# Patient Record
Sex: FEMALE | Race: WHITE | NOT HISPANIC OR LATINO | Employment: OTHER | URBAN - METROPOLITAN AREA
[De-identification: names, ages, dates, MRNs, and addresses within clinical notes are randomized per-mention and may not be internally consistent; named-entity substitution may affect disease eponyms.]

---

## 2017-03-09 ENCOUNTER — OFFICE VISIT (OUTPATIENT)
Dept: FAMILY MEDICINE | Facility: CLINIC | Age: 47
End: 2017-03-09
Payer: COMMERCIAL

## 2017-03-09 VITALS
HEIGHT: 65 IN | SYSTOLIC BLOOD PRESSURE: 120 MMHG | WEIGHT: 203 LBS | HEART RATE: 88 BPM | BODY MASS INDEX: 33.82 KG/M2 | TEMPERATURE: 98.7 F | DIASTOLIC BLOOD PRESSURE: 86 MMHG | OXYGEN SATURATION: 96 %

## 2017-03-09 DIAGNOSIS — J20.9 ACUTE BRONCHITIS, UNSPECIFIED ORGANISM: ICD-10-CM

## 2017-03-09 DIAGNOSIS — R05.9 COUGH: ICD-10-CM

## 2017-03-09 DIAGNOSIS — R68.89 FLU-LIKE SYMPTOMS: Primary | ICD-10-CM

## 2017-03-09 DIAGNOSIS — R07.0 THROAT PAIN: ICD-10-CM

## 2017-03-09 LAB
DEPRECATED S PYO AG THROAT QL EIA: NORMAL
FLUAV+FLUBV AG SPEC QL: NEGATIVE
FLUAV+FLUBV AG SPEC QL: NORMAL
MICRO REPORT STATUS: NORMAL
SPECIMEN SOURCE: NORMAL
SPECIMEN SOURCE: NORMAL

## 2017-03-09 PROCEDURE — 87081 CULTURE SCREEN ONLY: CPT | Performed by: PHYSICIAN ASSISTANT

## 2017-03-09 PROCEDURE — 87880 STREP A ASSAY W/OPTIC: CPT | Performed by: PHYSICIAN ASSISTANT

## 2017-03-09 PROCEDURE — 87804 INFLUENZA ASSAY W/OPTIC: CPT | Performed by: PHYSICIAN ASSISTANT

## 2017-03-09 PROCEDURE — 99214 OFFICE O/P EST MOD 30 MIN: CPT | Performed by: PHYSICIAN ASSISTANT

## 2017-03-09 RX ORDER — BENZONATATE 200 MG/1
200 CAPSULE ORAL 3 TIMES DAILY PRN
Qty: 30 CAPSULE | Refills: 0 | Status: SHIPPED | OUTPATIENT
Start: 2017-03-09 | End: 2017-09-11

## 2017-03-09 RX ORDER — ALBUTEROL SULFATE 90 UG/1
2 AEROSOL, METERED RESPIRATORY (INHALATION) EVERY 6 HOURS PRN
Qty: 1 INHALER | Refills: 0 | Status: SHIPPED | OUTPATIENT
Start: 2017-03-09 | End: 2017-09-11

## 2017-03-09 RX ORDER — AZITHROMYCIN 250 MG/1
TABLET, FILM COATED ORAL
Qty: 6 TABLET | Refills: 0 | Status: SHIPPED | OUTPATIENT
Start: 2017-03-09 | End: 2017-09-11

## 2017-03-09 RX ORDER — CLINDAMYCIN PHOSPHATE 11.9 MG/ML
SOLUTION TOPICAL
Refills: 3 | COMMUNITY
Start: 2016-09-27 | End: 2021-01-19

## 2017-03-09 ASSESSMENT — PAIN SCALES - GENERAL: PAINLEVEL: MILD PAIN (2)

## 2017-03-09 NOTE — PATIENT INSTRUCTIONS
Tessalon pearls three times daily for cough    Albuterol inhaler 2 puffs every 6 hrs as needed    Monitor sxs over the next few days. If worsening or not improving at the end of the weekend, then would start the zithromax.    If high fever, chest pain, short of breath, painful breathing should be seen again.

## 2017-03-09 NOTE — MR AVS SNAPSHOT
After Visit Summary   3/9/2017    Neelima Gardner    MRN: [id number]           Patient Information     Date Of Birth          1970        Visit Information        Provider Department      3/9/2017 1:40 PM Sonali Melendrez PA-C [facility]        Today's Diagnoses     Flu-like symptoms    -  1    Throat pain        Cough        Acute bronchitis, unspecified organism          Care Instructions    Tessalon pearls three times daily for cough    Albuterol inhaler 2 puffs every 6 hrs as needed    Monitor sxs over the next few days. If worsening or not improving at the end of the weekend, then would start the zithromax.    If high fever, chest pain, short of breath, painful breathing should be seen again.         Follow-ups after your visit        Who to contact     If you have questions or need follow up information about today's clinic visit or your schedule please contact [facility] directly at [phone].  Normal or non-critical lab and imaging results will be communicated to you by [employer], letter or phone within 4 business days after the clinic has received the results. If you do not hear from us within 7 days, please contact the clinic through [employer] or phone. If you have a critical or abnormal lab result, we will notify you by phone as soon as possible.  Submit refill requests through [employer] or call your pharmacy and they will forward the refill request to us. Please allow 3 business days for your refill to be completed.          Additional Information About Your Visit        MyChart Information     [employer] gives you secure access to your electronic health record. If you see a primary care provider, you can also send messages to your care team and make appointments. If you have questions, please call your primary care clinic.  If you do not have a primary care provider, please call [phone] and they will assist you.        Care EveryWhere ID     This is your Care  "EveryWhere ID. This could be used by other organizations to access your [facility] medical records  [id number]        Your Vitals Were     Pulse Temperature Height Last Period Pulse Oximetry BMI (Body Mass Index)    88 98.7  F (37.1  C) (Temporal) 5' 5\" (1.651 m) 02/22/2017 96% 33.78 kg/m2       Blood Pressure from Last 3 Encounters:   03/09/17 120/86   12/15/15 131/83   08/19/15 124/78    Weight from Last 3 Encounters:   03/09/17 203 lb (92.1 kg)   12/15/15 198 lb 9.6 oz (90.1 kg)   07/29/15 196 lb 8 oz (89.1 kg)              We Performed the Following     Beta strep group A culture     Influenza A/B antigen     Strep, Rapid Screen          Today's Medication Changes          These changes are accurate as of: 3/9/17  2:21 PM.  If you have any questions, ask your nurse or doctor.               Start taking these medicines.        Dose/Directions    albuterol 108 (90 BASE) MCG/ACT Inhaler   Commonly known as:  PROAIR HFA/PROVENTIL HFA/VENTOLIN HFA   Used for:  Cough   Started by:  Sonali Melendrez PA-C        Dose:  2 puff   Inhale 2 puffs into the lungs every 6 hours as needed for shortness of breath / dyspnea or wheezing   Quantity:  1 Inhaler   Refills:  0       azithromycin 250 MG tablet   Commonly known as:  ZITHROMAX   Used for:  Acute bronchitis, unspecified organism   Started by:  Sonali Melendrez PA-C        Two tablets first day, then one tablet daily for four days.   Quantity:  6 tablet   Refills:  0       benzonatate 200 MG capsule   Commonly known as:  TESSALON   Used for:  Cough   Started by:  Sonali Melendrez PA-C        Dose:  200 mg   Take 1 capsule (200 mg) by mouth 3 times daily as needed for cough   Quantity:  30 capsule   Refills:  0            Where to get your medicines      These medications were sent to [facility] [address] - [address]  [address], [place] [address]    Hours:  test fax sent successfully 1/20/04  kr " Phone:  [phone]     albuterol 108 (90 BASE) MCG/ACT Inhaler    azithromycin 250 MG tablet    benzonatate 200 MG capsule                Primary Care Provider Office Phone # Fax #    Vani Yang [phone]       [facility] [address] [facility] [address]        Thank you!     Thank you for choosing [facility]  for your care. Our goal is always to provide you with excellent care. Hearing back from our patients is one way we can continue to improve our services. Please take a few minutes to complete the written survey that you may receive in the mail after your visit with us. Thank you!             Your Updated Medication List - Protect others around you: Learn how to safely use, store and throw away your medicines at www.disposemymeds.org.          This list is accurate as of: 3/9/17  2:21 PM.  Always use your most recent med list.                   Brand Name Dispense Instructions for use    ADVIL PO      Take 200 mg by mouth every 6 hours as needed for moderate pain       albuterol 108 (90 BASE) MCG/ACT Inhaler    PROAIR HFA/PROVENTIL HFA/VENTOLIN HFA    1 Inhaler    Inhale 2 puffs into the lungs every 6 hours as needed for shortness of breath / dyspnea or wheezing       ALLEGRA PO      Take by mouth as needed for allergies       azithromycin 250 MG tablet    ZITHROMAX    6 tablet    Two tablets first day, then one tablet daily for four days.       benzonatate 200 MG capsule    TESSALON    30 capsule    Take 1 capsule (200 mg) by mouth 3 times daily as needed for cough       clindamycin 1 % solution    CLEOCIN T     Reported on 3/9/2017       EXCEDRIN PO      PRN

## 2017-03-09 NOTE — PROGRESS NOTES
"  SUBJECTIVE:                                                    Neelima Gardner is a 46 year old female who presents to clinic today for the following health issues:      Acute Illness   Acute illness concerns: cough   Onset: was sick for 2 weeks end of February-- same as this, got better for about 10 days. Totally back to normal.   Then has been sick again-- most recent illness - has been 1 week.  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     Fever: YES- low grade fever -- never took it thinks about 100.0 was yesterday.     Chills/Sweats: no    Headache (location?): YES- facial, when cough \"has to hold head because hurts'-  Pressure.     Sinus Pressure; no;  post-nasal drainage, headache     Conjunctivitis:  no    Ear Pain: no    Rhinorrhea: YES- mostly clear    Congestion: YES    Sore Throat: YES     Cough: YES-non-productive/productive clear ; worse when laying down at night.     Wheeze: YES    CP, SOB - no. But feels congested and winded with stairs.     Decreased Appetite: YES    Nausea: no    Vomiting: no    Diarrhea:  YES-\" the first three day, im fine now\"    Dysuria/Freq.: no     Fatigue/Achiness: YES- fatigue     Sick/Strep Exposure: no     No rashes  No abd pain    No asthma or lung conditions.   + allergies seasonal  + smoker-- had quit 3 years but restarted. 3-4 cig per day.          Therapies Tried and outcome: OTC meds -- nyquil, dayquil, sudafed, allergy meds, cough drops        Problem list and histories reviewed & adjusted, as indicated.  Additional history: as documented    Patient Active Problem List   Diagnosis     CARDIOVASCULAR SCREENING; LDL GOAL LESS THAN 160     Common wart     Past Surgical History   Procedure Laterality Date     C/section, low transverse       , Low Transverse       Social History   Substance Use Topics     Smoking status: Light Tobacco Smoker     Packs/day: 1.00     Types: Cigarettes     Last " "attempt to quit: 1/17/2011     Smokeless tobacco: Never Used     Alcohol use Yes      Comment: Occ     Family History   Problem Relation Age of Onset     OSTEOPOROSIS Mother      DIABETES Father      Colon Cancer Paternal Aunt          Current Outpatient Prescriptions   Medication Sig Dispense Refill     albuterol (PROAIR HFA/PROVENTIL HFA/VENTOLIN HFA) 108 (90 BASE) MCG/ACT Inhaler Inhale 2 puffs into the lungs every 6 hours as needed for shortness of breath / dyspnea or wheezing 1 Inhaler 0     benzonatate (TESSALON) 200 MG capsule Take 1 capsule (200 mg) by mouth 3 times daily as needed for cough 30 capsule 0     Ibuprofen (ADVIL PO) Take 200 mg by mouth every 6 hours as needed for moderate pain       Fexofenadine HCl (ALLEGRA PO) Take by mouth as needed for allergies       EXCEDRIN OR PRN       clindamycin (CLEOCIN T) 1 % solution Reported on 3/9/2017  3     Allergies   Allergen Reactions     Codeine      Sulfa Drugs      BP Readings from Last 3 Encounters:   03/09/17 120/86   12/15/15 131/83   08/19/15 124/78    Wt Readings from Last 3 Encounters:   03/09/17 203 lb (92.1 kg)   12/15/15 198 lb 9.6 oz (90.1 kg)   07/29/15 196 lb 8 oz (89.1 kg)                  Labs reviewed in EPIC    Reviewed and updated as needed this visit by clinical staff       Reviewed and updated as needed this visit by Provider         ROS:  Constitutional, HEENT, cardiovascular, pulmonary, gi and gu systems are negative, except as otherwise noted.    OBJECTIVE:                                                    /86 (BP Location: Left arm, Cuff Size: Adult Large)  Pulse 88  Temp 98.7  F (37.1  C) (Temporal)  Ht 5' 5\" (1.651 m)  Wt 203 lb (92.1 kg)  LMP 02/22/2017  SpO2 96%  BMI 33.78 kg/m2  Body mass index is 33.78 kg/(m^2).  GENERAL: mildly ill appearing, congested, alert and no distress, breathing comfortably  EYES: Eyes grossly normal to inspection, PERRL and conjunctivae and sclerae normal  HENT: Normal cephalic/atraumatic. Ear " canals clear and TM's normal, no effusion. Nose mucosa erythematous and swollen turbinates. Lips normal, no lesions. Buccal muscosa moist. Soft palate no lesions or ulcers. Tonsils normal, no significant erythema, no exudates. Uvula midline. Posterior oropharynx mild erythema, no drainage.   NECK: no adenopathy and no asymmetry, masses, or scars  RESP: lungs clear to auscultation - no rales, rhonchi or wheezes; cough dry, bronchial  CV: regular rate and rhythm, normal S1 S2, no S3 or S4, no murmur, click or rub  SKIN: no suspicious lesions or rashes      Diagnostic Test Results:  Results for orders placed or performed in visit on 03/09/17 (from the past 24 hour(s))   Strep, Rapid Screen   Result Value Ref Range    Specimen Description Throat     Rapid Strep A Screen       NEGATIVE: No Group A streptococcal antigen detected by immunoassay, await   culture report.      Micro Report Status FINAL 03/09/2017    Influenza A/B antigen   Result Value Ref Range    Influenza A/B Agn Specimen Nasopharyngeal     Influenza A Negative NEG    Influenza B  NEG     Negative   Test results must be correlated with clinical data. If necessary, results   should be confirmed by a molecular assay or viral culture.          ASSESSMENT/PLAN:                                                      1. Flu-like symptoms  Negative influenza  - Influenza A/B antigen    2. Throat pain  Negative strep, will send for culture and treat if positive.  - Strep, Rapid Screen  - Beta strep group A culture    3. Cough  Discussed viral bronchitis. At this stage, would not start antibitoic  Treat as below.   - albuterol (PROAIR HFA/PROVENTIL HFA/VENTOLIN HFA) 108 (90 BASE) MCG/ACT Inhaler; Inhale 2 puffs into the lungs every 6 hours as needed for shortness of breath / dyspnea or wheezing  Dispense: 1 Inhaler; Refill: 0  - benzonatate (TESSALON) 200 MG capsule; Take 1 capsule (200 mg) by mouth 3 times daily as needed for cough  Dispense: 30 capsule; Refill:  0    4. Acute bronchitis, unspecified organism  Discussed montior into weekend. If at weekend end, worsening (productive cough, increasing sinus pain/drainage), with her smoking, would treat as below.  Should be seen though if new fever, CP, SOB, pleuritic pain.   - azithromycin (ZITHROMAX) 250 MG tablet; Two tablets first day, then one tablet daily for four days.  Dispense: 6 tablet; Refill: 0    Follow Up: For worsening symptoms (ie new fevers, worsening pain, etc), non-improvement as expected/discussed, questions regarding your medications or treatment plan. Discussed parameters for follow up and included in After Visit Summary given to patient.      Sonali Melendrez PA-C  [facility]

## 2017-03-09 NOTE — NURSING NOTE
"Chief Complaint   Patient presents with     Cough     Cold Symptoms     Panel Management     FAUSTO, Tdap, Pap       Initial /86 (BP Location: Left arm, Cuff Size: Adult Large)  Pulse 88  Temp 98.7  F (37.1  C) (Temporal)  Ht 5' 5\" (1.651 m)  Wt 203 lb (92.1 kg)  LMP 02/22/2017  SpO2 96%  BMI 33.78 kg/m2 Estimated body mass index is 33.78 kg/(m^2) as calculated from the following:    Height as of this encounter: 5' 5\" (1.651 m).    Weight as of this encounter: 203 lb (92.1 kg).  Medication Reconciliation: complete       Umair Bailey MA    "

## 2017-03-11 LAB
BACTERIA SPEC CULT: NORMAL
MICRO REPORT STATUS: NORMAL
SPECIMEN SOURCE: NORMAL

## 2017-06-08 ENCOUNTER — TRANSFERRED RECORDS (OUTPATIENT)
Dept: HEALTH INFORMATION MANAGEMENT | Facility: CLINIC | Age: 47
End: 2017-06-08

## 2017-06-12 ENCOUNTER — TRANSFERRED RECORDS (OUTPATIENT)
Dept: HEALTH INFORMATION MANAGEMENT | Facility: CLINIC | Age: 47
End: 2017-06-12

## 2017-06-12 LAB
CHOLEST SERPL-MCNC: 200 MG/DL (ref 100–199)
GLUCOSE SERPL-MCNC: 100 MG/DL (ref 65–99)
HDLC SERPL-MCNC: 51 MG/DL
LDLC SERPL CALC-MCNC: 128 MG/DL (ref 0–99)
TRIGL SERPL-MCNC: 105 MG/DL (ref 0–149)
TSH SERPL-ACNC: 2.3 ULU/ML (ref 0.45–4.5)

## 2017-06-14 ENCOUNTER — TRANSFERRED RECORDS (OUTPATIENT)
Dept: HEALTH INFORMATION MANAGEMENT | Facility: CLINIC | Age: 47
End: 2017-06-14

## 2017-06-15 ENCOUNTER — TRANSFERRED RECORDS (OUTPATIENT)
Dept: HEALTH INFORMATION MANAGEMENT | Facility: CLINIC | Age: 47
End: 2017-06-15

## 2017-07-07 ENCOUNTER — TRANSFERRED RECORDS (OUTPATIENT)
Dept: HEALTH INFORMATION MANAGEMENT | Facility: CLINIC | Age: 47
End: 2017-07-07

## 2017-07-21 ENCOUNTER — TRANSFERRED RECORDS (OUTPATIENT)
Dept: HEALTH INFORMATION MANAGEMENT | Facility: CLINIC | Age: 47
End: 2017-07-21

## 2017-09-11 ENCOUNTER — RADIANT APPOINTMENT (OUTPATIENT)
Dept: GENERAL RADIOLOGY | Facility: CLINIC | Age: 47
End: 2017-09-11
Attending: PHYSICIAN ASSISTANT
Payer: COMMERCIAL

## 2017-09-11 ENCOUNTER — OFFICE VISIT (OUTPATIENT)
Dept: FAMILY MEDICINE | Facility: CLINIC | Age: 47
End: 2017-09-11
Payer: COMMERCIAL

## 2017-09-11 VITALS
RESPIRATION RATE: 18 BRPM | SYSTOLIC BLOOD PRESSURE: 134 MMHG | TEMPERATURE: 98.5 F | DIASTOLIC BLOOD PRESSURE: 82 MMHG | WEIGHT: 186.9 LBS | HEART RATE: 78 BPM | BODY MASS INDEX: 31.14 KG/M2 | OXYGEN SATURATION: 96 % | HEIGHT: 65 IN

## 2017-09-11 DIAGNOSIS — R05.9 COUGH: ICD-10-CM

## 2017-09-11 DIAGNOSIS — J20.9 ACUTE BRONCHITIS, UNSPECIFIED ORGANISM: Primary | ICD-10-CM

## 2017-09-11 DIAGNOSIS — Z71.6 ENCOUNTER FOR SMOKING CESSATION COUNSELING: ICD-10-CM

## 2017-09-11 PROCEDURE — 99214 OFFICE O/P EST MOD 30 MIN: CPT | Performed by: PHYSICIAN ASSISTANT

## 2017-09-11 PROCEDURE — 71020 XR CHEST 2 VW: CPT

## 2017-09-11 RX ORDER — PREDNISONE 20 MG/1
20 TABLET ORAL DAILY
Qty: 5 TABLET | Refills: 0 | Status: SHIPPED | OUTPATIENT
Start: 2017-09-11 | End: 2017-12-13

## 2017-09-11 RX ORDER — ALBUTEROL SULFATE 90 UG/1
2 AEROSOL, METERED RESPIRATORY (INHALATION) EVERY 6 HOURS PRN
Qty: 1 INHALER | Refills: 0 | Status: SHIPPED | OUTPATIENT
Start: 2017-09-11 | End: 2020-01-16

## 2017-09-11 RX ORDER — IPRATROPIUM BROMIDE AND ALBUTEROL SULFATE 2.5; .5 MG/3ML; MG/3ML
1 SOLUTION RESPIRATORY (INHALATION) ONCE
Qty: 1 VIAL | Refills: 0 | Status: SHIPPED | OUTPATIENT
Start: 2017-09-11 | End: 2020-01-16

## 2017-09-11 RX ORDER — AZITHROMYCIN 250 MG/1
TABLET, FILM COATED ORAL
Qty: 6 TABLET | Refills: 0 | Status: SHIPPED | OUTPATIENT
Start: 2017-09-11 | End: 2017-12-13

## 2017-09-11 ASSESSMENT — PAIN SCALES - GENERAL: PAINLEVEL: NO PAIN (0)

## 2017-09-11 NOTE — PROGRESS NOTES
SUBJECTIVE:                                                    Neelima Gardner is a 47 year old female who presents to clinic today for the following health issues:    HPI    Acute Illness   Acute illness concerns: Cough  Onset: - 7 days of sx.   Started with sore throat, low grade fever and feeling like allergies- runny nose, water eyes, congestion, and feeling very fatigued, didn't go to work.    Headache from coughing  Cough is dry, not getting anything up. Sounds rattly and wheezy to her.   Nasal drainage turning off color but improving some.    Fever: YES- low grade- 1st day. Gone since.     Chills/Sweats: YES- sweats    Headache (location?): YES    Sinus Pressure:YES- post-nasal drainage and teeth pain    Conjunctivitis:  no    Ear Pain: no. Plugged feeling.     Rhinorrhea: YES- improving a little.     Congestion: YES    Sore Throat: no     Cough: YES-non-productive. Worsening. No CP. Feels like tightness in chest, some SOB. No pleuritic pain    Wheeze: YES    Decreased Appetite: YES    Nausea: YES.     No abd pain.     Vomiting: no    Diarrhea:  YES-- loose stools, 4-5x per day. Not watery, no tarry or bloody. Brown stool.     Dysuria/Freq.: no    Fatigue/Achiness: YES    Sick/Strep Exposure: no    No rashes  No swelling in the legs  Smoking - 3/4th pack per day. Denies daily cough.   No asthma    Travel- drove to [facility] and [place] in last 1 month.   Work- sales     Therapies Tried and outcome: nyquil, delsyum, sudafed, allergy meds    Would like to quit smoking. Did in the past with chantix. Had 1 odd dream when taking it. Found it helpful. Would like to try it again.    Problem list and histories reviewed & adjusted, as indicated.  Additional history: as documented    Patient Active Problem List   Diagnosis     CARDIOVASCULAR SCREENING; LDL GOAL LESS THAN 160     Common wart     Past Surgical History:   Procedure Laterality Date     C/SECTION, LOW TRANSVERSE      , Low Transverse      "  Social History   Substance Use Topics     Smoking status: Light Tobacco Smoker     Packs/day: 1.00     Types: Cigarettes     Last attempt to quit: 1/17/2011     Smokeless tobacco: Never Used     Alcohol use Yes      Comment: Occ     Family History   Problem Relation Age of Onset     OSTEOPOROSIS Mother      DIABETES Father      Colon Cancer Paternal Aunt          Current Outpatient Prescriptions   Medication Sig Dispense Refill     albuterol (PROAIR HFA/PROVENTIL HFA/VENTOLIN HFA) 108 (90 BASE) MCG/ACT Inhaler Inhale 2 puffs into the lungs every 6 hours as needed for shortness of breath / dyspnea or wheezing 1 Inhaler 0     Ibuprofen (ADVIL PO) Take 200 mg by mouth every 6 hours as needed for moderate pain       Fexofenadine HCl (ALLEGRA PO) Take by mouth as needed for allergies       EXCEDRIN OR PRN       clindamycin (CLEOCIN T) 1 % solution Reported on 3/9/2017  3     Allergies   Allergen Reactions     Codeine      Sulfa Drugs      BP Readings from Last 3 Encounters:   09/11/17 134/82   03/09/17 120/86   12/15/15 131/83    Wt Readings from Last 3 Encounters:   09/11/17 186 lb 14.4 oz (84.8 kg)   03/09/17 203 lb (92.1 kg)   12/15/15 198 lb 9.6 oz (90.1 kg)                Labs reviewed in EPIC      ROS:  As above    OBJECTIVE:     /82 (Cuff Size: Adult Regular)  Pulse 78  Temp 98.5  F (36.9  C) (Temporal)  Resp 18  Ht 5' 5\" (1.651 m)  Wt 186 lb 14.4 oz (84.8 kg)  SpO2 96%  BMI 31.1 kg/m2  Body mass index is 31.1 kg/(m^2).  GENERAL: healthy, alert and no distress  EYES: Eyes grossly normal to inspection, PERRL and conjunctivae and sclerae normal  HENT: Normal cephalic/atraumatic. Ear canals clear and TM's normal, no effusion. Nose mucosa congested, erythematous. Lips normal, no lesions. Buccal muscosa moist. Soft palate no lesions or ulcers. Tonsils normal, no erythema, no exudates. Uvula midline. Posterior oropharynx no erythema.   NECK: no adenopathy  RESP: spasms of coughing worse with deep " breathing/talking; + rhonchi right base, occasional wheeze  CV: regular rate and rhythm, normal S1 S2, no S3 or S4, no murmur, click or rub, no peripheral edema and peripheral pulses strong  MS: no gross musculoskeletal defects noted, no edema, no calf tenderness  SKIN: no suspicious lesions or rashes  NEURO: Normal strength and tone, mentation intact and speech normal    Diagnostic Test Results:  Xray -   Xr Chest 2 Views    Result Date: 9/11/2017  CHEST TWO VIEWS   9/11/2017 2:13 PM HISTORY: Cough. COMPARISON: None. FINDINGS: The lungs are clear. No pleural effusions or pneumothorax. Heart size and pulmonary vascularity are within normal limits. No acute fracture.     IMPRESSION: No evidence of acute cardiopulmonary disease is seen.      Post-neb treatment:   Exam: improvement in wheeze, improved airmovement. Persisting rhonchi, noteable left side as well now.   Pt reported somewhat easier breathing.        ASSESSMENT/PLAN:       1. Acute bronchitis, unspecified organism  Treatment as below  Discussed how to take, poss side effects.   Albuterol for home use.   Warning s/sx for f/u if worsening, not improving  Smoking cessation. See #3  - azithromycin (ZITHROMAX) 250 MG tablet; Two tablets first day, then one tablet daily for four days.  Dispense: 6 tablet; Refill: 0  - albuterol (PROAIR HFA/PROVENTIL HFA/VENTOLIN HFA) 108 (90 BASE) MCG/ACT Inhaler; Inhale 2 puffs into the lungs every 6 hours as needed for shortness of breath / dyspnea or wheezing  Dispense: 1 Inhaler; Refill: 0  - predniSONE (DELTASONE) 20 MG tablet; Take 1 tablet (20 mg) by mouth daily  Dispense: 5 tablet; Refill: 0    2. Cough  - XR Chest 2 Views; Future  - ipratropium - albuterol 0.5 mg/2.5 mg/3 mL (DUONEB) 0.5-2.5 (3) MG/3ML neb solution; Take 1 vial (3 mLs) by nebulization once for 1 dose  Dispense: 1 vial; Refill: 0  - albuterol (PROAIR HFA/PROVENTIL HFA/VENTOLIN HFA) 108 (90 BASE) MCG/ACT Inhaler; Inhale 2 puffs into the lungs every 6 hours  as needed for shortness of breath / dyspnea or wheezing  Dispense: 1 Inhaler; Refill: 0    3. Encounter for smoking cessation counseling  Discussed how to take/start, possible side effects.   - varenicline (CHANTIX STARTING MONTH LEO) 0.5 MG X 11 & 1 MG X 42 tablet; Take 0.5 mg tab daily for 3 days, then 0.5 mg tab twice daily for 4 days, then 1 mg twice daily.  Dispense: 53 tablet; Refill: 0    Follow Up: For worsening symptoms (ie new fevers, worsening pain, etc), non-improvement as expected/discussed, questions regarding your medications or treatment plan. Discussed parameters for follow up and included in After Visit Summary given to patient.      Sonali Melendrez PA-C  [facility]

## 2017-09-11 NOTE — MR AVS SNAPSHOT
After Visit Summary   9/11/2017    Neelima Gardner    MRN: [id number]           Patient Information     Date Of Birth          1970        Visit Information        Provider Department      9/11/2017 1:20 PM Sonali Melendrez PA-C [facility]        Today's Diagnoses     Acute bronchitis, unspecified organism    -  1    Cough        Encounter for smoking cessation counseling          Care Instructions      Bronchitis, Antibiotic Treatment (Adult)    Bronchitis is an infection of the air passages (bronchial tubes) in your lungs. It often occurs when you have a cold. This illness is contagious during the first few days and is spread through the air by coughing and sneezing, or by direct contact (touching the sick person and then touching your own eyes, nose, or mouth).  Symptoms of bronchitis include cough with mucus (phlegm) and low-grade fever. Bronchitis usually lasts 7 to 14 days. Mild cases can be treated with simple home remedies. More severe infection is treated with an antibiotic.  Home care  Follow these guidelines when caring for yourself at home:    If your symptoms are severe, rest at home for the first 2 to 3 days. When you go back to your usual activities, don't let yourself get too tired.    Do not smoke. Also avoid being exposed to secondhand smoke.    You may use over-the-counter medicines to control fever or pain, unless another medicine was prescribed. (Note: If you have chronic liver or kidney disease or have ever had a stomach ulcer or gastrointestinal bleeding, talk with your healthcare provider before using these medicines. Also talk to your provider if you are taking medicine to prevent blood clots.) Aspirin should never be given to anyone younger than 18 years of age who is ill with a viral infection or fever. It may cause severe liver or brain damage.    Your appetite may be poor, so a light diet is fine. Avoid dehydration by drinking 6 to 8 glasses of fluids per  day (such as water, soft drinks, sports drinks, juices, tea, or soup). Extra fluids will help loosen secretions in the nose and lungs.    Over-the-counter cough, cold, and sore-throat medicines will not shorten the length of the illness, but they may be helpful to reduce symptoms. (Note: Do not use decongestants if you have high blood pressure.)    Finish all antibiotic medicine. Do this even if you are feeling better after only a few days.  Follow-up care  Follow up with your healthcare provider, or as advised. If you had an X-ray or ECG (electrocardiogram), a specialist will review it. You will be notified of any new findings that may affect your care.  Note: If you are age 65 or older, or if you have a chronic lung disease or condition that affects your immune system, or you smoke, talk to your healthcare provider about having pneumococcal vaccinations and a yearly influenza vaccination (flu shot).  When to seek medical advice  Call your healthcare provider right away if any of these occur:    Fever of 100.4 F (38 C) or higher    Coughing up increased amounts of colored sputum    Weakness, drowsiness, headache, facial pain, ear pain, or a stiff neck  Call 911, or get immediate medical care  Contact emergency services right away if any of these occur.    Coughing up blood    Worsening weakness, drowsiness, headache, or stiff neck    Trouble breathing, wheezing, or pain with breathing  Date Last Reviewed: 9/13/2015 2000-2017 [employer]. [address] [place] [address]. All rights reserved. This information is not intended as a substitute for professional medical care. Always follow your healthcare professional's instructions.      Prednisone: This is a strong anti-inflammatory.   Primary side effects can include insomnia (trouble with sleep), increased appetite, and irritability/moodiness.  By dosing this medication earlier in the day (taking in the morning and/or at lunch) can help  "reduce the sleep issues.    Do not take any NSAIDs while taking prednisone (examples of NSAIDs: ibuprofen, Advil, Aleve, naproxen, diclofenac, celebrex, etc).    Follow up if not improving,worsening, leg swelling, new fevers    --    Start chantix once you are done treating this infection            Follow-ups after your visit        Who to contact     If you have questions or need follow up information about today's clinic visit or your schedule please contact [facility] [place] directly at [phone].  Normal or non-critical lab and imaging results will be communicated to you by [employer], letter or phone within 4 business days after the clinic has received the results. If you do not hear from us within 7 days, please contact the clinic through [employer] or phone. If you have a critical or abnormal lab result, we will notify you by phone as soon as possible.  Submit refill requests through [employer] or call your pharmacy and they will forward the refill request to us. Please allow 3 business days for your refill to be completed.          Additional Information About Your Visit        [employer] Information     [employer] gives you secure access to your electronic health record. If you see a primary care provider, you can also send messages to your care team and make appointments. If you have questions, please call your primary care clinic.  If you do not have a primary care provider, please call [phone] and they will assist you.        Care EveryWhere ID     This is your Care EveryWhere ID. This could be used by other organizations to access your [place] medical records  [id number]        Your Vitals Were     Pulse Temperature Respirations Height Pulse Oximetry BMI (Body Mass Index)    78 98.5  F (36.9  C) (Temporal) 18 5' 5\" (1.651 m) 96% 31.1 kg/m2       Blood Pressure from Last 3 Encounters:   09/11/17 134/82   03/09/17 120/86   12/15/15 131/83    Weight from Last 3 Encounters:   09/11/17 186 lb 14.4 oz " (84.8 kg)   03/09/17 203 lb (92.1 kg)   12/15/15 198 lb 9.6 oz (90.1 kg)                 Today's Medication Changes          These changes are accurate as of: 9/11/17  2:31 PM.  If you have any questions, ask your nurse or doctor.               Start taking these medicines.        Dose/Directions    azithromycin 250 MG tablet   Commonly known as:  ZITHROMAX   Used for:  Acute bronchitis, unspecified organism   Started by:  Sonali Melendrez PA-C        Two tablets first day, then one tablet daily for four days.   Quantity:  6 tablet   Refills:  0       ipratropium - albuterol 0.5 mg/2.5 mg/3 mL 0.5-2.5 (3) MG/3ML neb solution   Commonly known as:  DUONEB   Used for:  Cough   Started by:  Sonali Melendrez PA-C        Dose:  1 vial   Take 1 vial (3 mLs) by nebulization once for 1 dose   Quantity:  1 vial   Refills:  0       predniSONE 20 MG tablet   Commonly known as:  DELTASONE   Used for:  Acute bronchitis, unspecified organism   Started by:  Sonali Melendrez PA-C        Dose:  20 mg   Take 1 tablet (20 mg) by mouth daily   Quantity:  5 tablet   Refills:  0       varenicline 0.5 MG X 11 & 1 MG X 42 tablet   Commonly known as:  CHANTIX STARTING MONTH LEO   Used for:  Encounter for smoking cessation counseling   Started by:  Sonali Melendrez PA-C        Take 0.5 mg tab daily for 3 days, then 0.5 mg tab twice daily for 4 days, then 1 mg twice daily.   Quantity:  53 tablet   Refills:  0            Where to get your medicines      These medications were sent to [email or web address] [place] - [address] AT SEC of [facility] [address]  [address] [place] [address]    Hours:  test fax sent successfully 1/20/04   Phone:  [phone]     albuterol 108 (90 BASE) MCG/ACT Inhaler    azithromycin 250 MG tablet    predniSONE 20 MG tablet    varenicline 0.5 MG X 11 & 1 MG X 42 tablet         Some of these will need a paper prescription and others can be bought over the counter.  Ask your  nurse if you have questions.     Bring a paper prescription for each of these medications     ipratropium - albuterol 0.5 mg/2.5 mg/3 mL 0.5-2.5 (3) MG/3ML neb solution                Primary Care Provider Office Phone # Fax #    Vanigrace Yang [phone] [address] [facility] [address]        Equal Access to Services     CARLO SMITH : Hadii aad ku hadasho Soomaali, waaxda luqadaha, qaybta kaalmada adeegyada, waxay idiin hayaan adeeg amarilys laNoeminapoleon . So [facility] [phone].    ATENCIÓN: Si habla español, tiene a winter disposición servicios gratuitos de asistencia lingüística. Llame al [phone].    We comply with applicable federal civil rights laws and [place] laws. We do not discriminate on the basis of race, color, national origin, age, disability sex, sexual orientation or gender identity.            Thank you!     Thank you for choosing [facility]  for your care. Our goal is always to provide you with excellent care. Hearing back from our patients is one way we can continue to improve our services. Please take a few minutes to complete the written survey that you may receive in the mail after your visit with us. Thank you!             Your Updated Medication List - Protect others around you: Learn how to safely use, store and throw away your medicines at www.disposemymeds.org.          This list is accurate as of: 9/11/17  2:31 PM.  Always use your most recent med list.                   Brand Name Dispense Instructions for use Diagnosis    ADVIL PO      Take 200 mg by mouth every 6 hours as needed for moderate pain        albuterol 108 (90 BASE) MCG/ACT Inhaler    PROAIR HFA/PROVENTIL HFA/VENTOLIN HFA    1 Inhaler    Inhale 2 puffs into the lungs every 6 hours as needed for shortness of breath / dyspnea or wheezing    Cough, Acute bronchitis, unspecified organism       ALLEGRA PO      Take by mouth as needed for allergies        azithromycin 250 MG tablet     ZITHROMAX    6 tablet    Two tablets first day, then one tablet daily for four days.    Acute bronchitis, unspecified organism       clindamycin 1 % solution    CLEOCIN T     Reported on 3/9/2017    Throat pain       EXCEDRIN PO      PRN        ipratropium - albuterol 0.5 mg/2.5 mg/3 mL 0.5-2.5 (3) MG/3ML neb solution    DUONEB    1 vial    Take 1 vial (3 mLs) by nebulization once for 1 dose    Cough       predniSONE 20 MG tablet    DELTASONE    5 tablet    Take 1 tablet (20 mg) by mouth daily    Acute bronchitis, unspecified organism       varenicline 0.5 MG X 11 & 1 MG X 42 tablet    CHANTIX STARTING MONTH LEO    53 tablet    Take 0.5 mg tab daily for 3 days, then 0.5 mg tab twice daily for 4 days, then 1 mg twice daily.    Encounter for smoking cessation counseling

## 2017-09-11 NOTE — PATIENT INSTRUCTIONS
Bronchitis, Antibiotic Treatment (Adult)    Bronchitis is an infection of the air passages (bronchial tubes) in your lungs. It often occurs when you have a cold. This illness is contagious during the first few days and is spread through the air by coughing and sneezing, or by direct contact (touching the sick person and then touching your own eyes, nose, or mouth).  Symptoms of bronchitis include cough with mucus (phlegm) and low-grade fever. Bronchitis usually lasts 7 to 14 days. Mild cases can be treated with simple home remedies. More severe infection is treated with an antibiotic.  Home care  Follow these guidelines when caring for yourself at home:    If your symptoms are severe, rest at home for the first 2 to 3 days. When you go back to your usual activities, don't let yourself get too tired.    Do not smoke. Also avoid being exposed to secondhand smoke.    You may use over-the-counter medicines to control fever or pain, unless another medicine was prescribed. (Note: If you have chronic liver or kidney disease or have ever had a stomach ulcer or gastrointestinal bleeding, talk with your healthcare provider before using these medicines. Also talk to your provider if you are taking medicine to prevent blood clots.) Aspirin should never be given to anyone younger than 18 years of age who is ill with a viral infection or fever. It may cause severe liver or brain damage.    Your appetite may be poor, so a light diet is fine. Avoid dehydration by drinking 6 to 8 glasses of fluids per day (such as water, soft drinks, sports drinks, juices, tea, or soup). Extra fluids will help loosen secretions in the nose and lungs.    Over-the-counter cough, cold, and sore-throat medicines will not shorten the length of the illness, but they may be helpful to reduce symptoms. (Note: Do not use decongestants if you have high blood pressure.)    Finish all antibiotic medicine. Do this even if you are feeling better after only a  few days.  Follow-up care  Follow up with your healthcare provider, or as advised. If you had an X-ray or ECG (electrocardiogram), a specialist will review it. You will be notified of any new findings that may affect your care.  Note: If you are age 65 or older, or if you have a chronic lung disease or condition that affects your immune system, or you smoke, talk to your healthcare provider about having pneumococcal vaccinations and a yearly influenza vaccination (flu shot).  When to seek medical advice  Call your healthcare provider right away if any of these occur:    Fever of 100.4 F (38 C) or higher    Coughing up increased amounts of colored sputum    Weakness, drowsiness, headache, facial pain, ear pain, or a stiff neck  Call 911, or get immediate medical care  Contact emergency services right away if any of these occur.    Coughing up blood    Worsening weakness, drowsiness, headache, or stiff neck    Trouble breathing, wheezing, or pain with breathing  Date Last Reviewed: 9/13/2015 2000-2017 [employer]. [address] [place] [address]. All rights reserved. This information is not intended as a substitute for professional medical care. Always follow your healthcare professional's instructions.      Prednisone: This is a strong anti-inflammatory.   Primary side effects can include insomnia (trouble with sleep), increased appetite, and irritability/moodiness.  By dosing this medication earlier in the day (taking in the morning and/or at lunch) can help reduce the sleep issues.    Do not take any NSAIDs while taking prednisone (examples of NSAIDs: ibuprofen, Advil, Aleve, naproxen, diclofenac, celebrex, etc).    Follow up if not improving,worsening, leg swelling, new fevers    --    Start chantix once you are done treating this infection

## 2017-09-11 NOTE — NURSING NOTE
"Chief Complaint   Patient presents with     URI     Panel Management     Tdap, Pap, Flu       Initial /82 (Cuff Size: Adult Regular)  Pulse 78  Temp 98.5  F (36.9  C) (Temporal)  Resp 18  Ht 5' 5\" (1.651 m)  Wt 186 lb 14.4 oz (84.8 kg)  SpO2 96%  BMI 31.1 kg/m2 Estimated body mass index is 31.1 kg/(m^2) as calculated from the following:    Height as of this encounter: 5' 5\" (1.651 m).    Weight as of this encounter: 186 lb 14.4 oz (84.8 kg).  Medication Reconciliation: complete   Dayan Wayne CMA (AAMA)    "

## 2017-09-11 NOTE — NURSING NOTE
Prior to injection verified patient identity using patient's name and date of birth.    The following nebulizer treatment was given:     MEDICATION: Ipratropium Bromide 0.02%   : ip.access  LOT #: [id number]  EXPIRATION DATE:  10/17  NDC # [address]-84   Dayan Wayne CMA ([facility])

## 2017-10-22 ENCOUNTER — HEALTH MAINTENANCE LETTER (OUTPATIENT)
Age: 47
End: 2017-10-22

## 2017-12-11 NOTE — PROGRESS NOTES
"  SUBJECTIVE:                                                    Neelima Gardner is a 47 year old female who presents to clinic today for the following health issues:      History of Present Illness     Diet:  Regular (no restrictions)  Frequency of exercise:  2-3 days/week  Duration of exercise:  30-45 minutes  Taking medications regularly:  Not Applicable  Medication side effects:  None  Additional concerns today:  No      Wants Referral to neurology     Right hand numbness     Onset: ongoing for 2.5 years   RIGHT hand: numbness, pins/needles sensation. Always right hand, constant but degree fluctuates. Located glove distribution of right hand, all fingers, sometimes has sensation/numbness into forearm to elbow. This weekend also right side- buttock tingling. Has times where tingling in left foot or right foot, not a radicular pattern.   Feels like right hand is weak at times. Hard to write or  pen at times. No weakness in legs.   Has had evaluation for this in the past- including Brain MRI (12/2015), Cspine MRI (058/2015), EMG/carpal tunnel. Labs included blood sugar, lyme, B12, thyroid.   \"Gave up on it\" because didn't come up with anything. No treatment offered.  Now sx are worse so ready to pursue something different.   Not on thyroid medication. No personal hx of diabetes. No hx of gastric surgery. No tick exposure risk that she is concerned about.  Neurology: , . Told doesn't have MS or carpal tunnel.   Would like to see Dr.Robert Silvio Tucker. Referred by a friend.  Denies visual disturbances, clear radicular pattern, bowel or bladder sx/changes, speech changes.     Description: right hand numbness that radiates into right elbow. Difficulty  writing and putting on makeup.   Location: right hand   Character: no pain     Intensity: mild    Progression of Symptoms: intermittent    Accompanying Signs & Symptoms:  Other symptoms: numbness and tingling    History:   Previous similar pain: YES  "     Precipitating factors:   Trauma or overuse: no     Alleviating factors:  Improved by: nothing    Therapies Tried and outcome: No therapies tried.       Problem list and histories reviewed & adjusted, as indicated.  Additional history: as documented      Patient Active Problem List   Diagnosis     CARDIOVASCULAR SCREENING; LDL GOAL LESS THAN 160     Common wart     Past Surgical History:   Procedure Laterality Date     C/SECTION, LOW TRANSVERSE      , Low Transverse       Social History   Substance Use Topics     Smoking status: Light Tobacco Smoker     Packs/day: 1.00     Types: Cigarettes     Last attempt to quit: 2011     Smokeless tobacco: Never Used     Alcohol use Yes      Comment: Occ     Family History   Problem Relation Age of Onset     OSTEOPOROSIS Mother      DIABETES Father      Colon Cancer Paternal Aunt          Current Outpatient Prescriptions   Medication Sig Dispense Refill     EXCEDRIN OR PRN       ipratropium - albuterol 0.5 mg/2.5 mg/3 mL (DUONEB) 0.5-2.5 (3) MG/3ML neb solution Take 1 vial (3 mLs) by nebulization once for 1 dose 1 vial 0     albuterol (PROAIR HFA/PROVENTIL HFA/VENTOLIN HFA) 108 (90 BASE) MCG/ACT Inhaler Inhale 2 puffs into the lungs every 6 hours as needed for shortness of breath / dyspnea or wheezing (Patient not taking: Reported on 2017) 1 Inhaler 0     clindamycin (CLEOCIN T) 1 % solution Reported on 3/9/2017  3     Ibuprofen (ADVIL PO) Take 200 mg by mouth every 6 hours as needed for moderate pain       Fexofenadine HCl (ALLEGRA PO) Take by mouth as needed for allergies       Allergies   Allergen Reactions     Codeine      Sulfa Drugs      BP Readings from Last 3 Encounters:   17 130/83   17 134/82   17 120/86    Wt Readings from Last 3 Encounters:   17 185 lb 9.6 oz (84.2 kg)   17 186 lb 14.4 oz (84.8 kg)   17 203 lb (92.1 kg)                  Labs reviewed in EPIC        ROS:  Constitutional, HEENT, cardiovascular,  "pulmonary, gi and gu systems are negative, except as otherwise noted.      OBJECTIVE:   /83  Pulse 101  Temp 99.1  F (37.3  C) (Temporal)  Resp 16  Ht 5' 4.5\" (1.638 m)  Wt 185 lb 9.6 oz (84.2 kg)  SpO2 98%  BMI 31.37 kg/m2  Body mass index is 31.37 kg/(m^2).  GENERAL: healthy, alert and no distress  EYES: Eyes grossly normal to inspection, PERRL and conjunctivae and sclerae normal  HENT: ear canals and TM's normal, nose and mouth without ulcers or lesions  NECK: no adenopathy, no asymmetry, masses, or scars and thyroid normal to palpation  RESP: lungs clear to auscultation - no rales, rhonchi or wheezes  CV: regular rate and rhythm, normal S1 S2, no S3 or S4, no murmur, click or rub, no peripheral edema and peripheral pulses strong  NEURO: Right hand: hypoesthesia to light touch vs left over palmar fingers 1-5, to lesser degree over palm and dorsum of hand. Tinnel and phalens does not worsen sx.  CN 2-12 intact, cerebellar exam normal- fluid finger-nose pursuit and heel-shin trace. Reflexes symmetric patellar and brachial. Strength 5/5 and symmetric in extremities. Gait normal. Romberg negative/normal.   PSYCH: mentation appears normal, affect normal/bright, concerned.     Diagnostic Test Results:  Pending at time of note closure      ASSESSMENT/PLAN:       1. Paresthesias  Will repeat labs that were done in 2015.   Pt does not feel necessary to repeat lyme testing.   Reviewed MRI reports and consult notes from neurology.   Will refer pt to neuro- pt preference to see [facility], specifically . Pt instructed to complete RUPERTO and referral was faxed.    - NEUROLOGY ADULT REFERRAL  - Comprehensive metabolic panel (BMP + Alb, Alk Phos, ALT, AST, Total. Bili, TP)  - CBC with platelets  - Vitamin B12  - TSH with free T4 reflex    Follow Up: For worsening symptoms (ie new fevers, worsening pain, etc), non-improvement as expected/discussed, questions regarding your medications or treatment plan. Discussed " parameters for follow up and included in After Visit Summary given to patient.      Sonali Melendrez PA-C  [facility]  Answers for HPI/ROS submitted by the patient on 12/13/2017   PHQ-2 Score: 0

## 2017-12-13 ENCOUNTER — OFFICE VISIT (OUTPATIENT)
Dept: FAMILY MEDICINE | Facility: CLINIC | Age: 47
End: 2017-12-13
Payer: COMMERCIAL

## 2017-12-13 VITALS
DIASTOLIC BLOOD PRESSURE: 83 MMHG | HEIGHT: 65 IN | SYSTOLIC BLOOD PRESSURE: 130 MMHG | BODY MASS INDEX: 30.92 KG/M2 | WEIGHT: 185.6 LBS | HEART RATE: 101 BPM | OXYGEN SATURATION: 98 % | RESPIRATION RATE: 16 BRPM | TEMPERATURE: 99.1 F

## 2017-12-13 DIAGNOSIS — R20.2 PARESTHESIAS: Primary | ICD-10-CM

## 2017-12-13 LAB
ALBUMIN SERPL-MCNC: 3.8 G/DL (ref 3.4–5)
ALP SERPL-CCNC: 77 U/L (ref 40–150)
ALT SERPL W P-5'-P-CCNC: 28 U/L (ref 0–50)
ANION GAP SERPL CALCULATED.3IONS-SCNC: 7 MMOL/L (ref 3–14)
AST SERPL W P-5'-P-CCNC: 22 U/L (ref 0–45)
BILIRUB SERPL-MCNC: 0.4 MG/DL (ref 0.2–1.3)
BUN SERPL-MCNC: 12 MG/DL (ref 7–30)
CALCIUM SERPL-MCNC: 8.9 MG/DL (ref 8.5–10.1)
CHLORIDE SERPL-SCNC: 107 MMOL/L (ref 94–109)
CO2 SERPL-SCNC: 25 MMOL/L (ref 20–32)
CREAT SERPL-MCNC: 0.79 MG/DL (ref 0.52–1.04)
ERYTHROCYTE [DISTWIDTH] IN BLOOD BY AUTOMATED COUNT: 12.9 % (ref 10–15)
GFR SERPL CREATININE-BSD FRML MDRD: 78 ML/MIN/1.7M2
GLUCOSE SERPL-MCNC: 105 MG/DL (ref 70–99)
HCT VFR BLD AUTO: 41.4 % (ref 35–47)
HGB BLD-MCNC: 13.9 G/DL (ref 11.7–15.7)
MCH RBC QN AUTO: 33.1 PG (ref 26.5–33)
MCHC RBC AUTO-ENTMCNC: 33.6 G/DL (ref 31.5–36.5)
MCV RBC AUTO: 99 FL (ref 78–100)
PLATELET # BLD AUTO: 335 10E9/L (ref 150–450)
POTASSIUM SERPL-SCNC: 4.3 MMOL/L (ref 3.4–5.3)
PROT SERPL-MCNC: 7.1 G/DL (ref 6.8–8.8)
RBC # BLD AUTO: 4.2 10E12/L (ref 3.8–5.2)
SODIUM SERPL-SCNC: 139 MMOL/L (ref 133–144)
TSH SERPL DL<=0.005 MIU/L-ACNC: 1.97 MU/L (ref 0.4–4)
VIT B12 SERPL-MCNC: 224 PG/ML (ref 193–986)
WBC # BLD AUTO: 6.1 10E9/L (ref 4–11)

## 2017-12-13 PROCEDURE — 36415 COLL VENOUS BLD VENIPUNCTURE: CPT | Performed by: PHYSICIAN ASSISTANT

## 2017-12-13 PROCEDURE — 80053 COMPREHEN METABOLIC PANEL: CPT | Performed by: PHYSICIAN ASSISTANT

## 2017-12-13 PROCEDURE — 84443 ASSAY THYROID STIM HORMONE: CPT | Performed by: PHYSICIAN ASSISTANT

## 2017-12-13 PROCEDURE — 99214 OFFICE O/P EST MOD 30 MIN: CPT | Performed by: PHYSICIAN ASSISTANT

## 2017-12-13 PROCEDURE — 82607 VITAMIN B-12: CPT | Performed by: PHYSICIAN ASSISTANT

## 2017-12-13 PROCEDURE — 85027 COMPLETE CBC AUTOMATED: CPT | Performed by: PHYSICIAN ASSISTANT

## 2017-12-13 ASSESSMENT — PAIN SCALES - GENERAL: PAINLEVEL: NO PAIN (0)

## 2017-12-13 NOTE — NURSING NOTE
"Chief Complaint   Patient presents with     Panel Management     tetanus, flu, pap, tobacco cessation     Referral       Initial /83  Pulse 101  Temp 99.1  F (37.3  C) (Temporal)  Resp 16  Ht 5' 4.5\" (1.638 m)  Wt 185 lb 9.6 oz (84.2 kg)  SpO2 98%  BMI 31.37 kg/m2 Estimated body mass index is 31.37 kg/(m^2) as calculated from the following:    Height as of this encounter: 5' 4.5\" (1.638 m).    Weight as of this encounter: 185 lb 9.6 oz (84.2 kg).   Medication Reconciliation: complete       Cher Blackman MA       "

## 2017-12-13 NOTE — PATIENT INSTRUCTIONS
Referral for neurology-  Sign Release of Information - our records at [facility] and [facility] ( consult, EMG ) as well as MRIs (brain and Cspine) and labs to go to [facility].  Then have [facility] send their reports to us  Will recheck blood work today

## 2017-12-13 NOTE — MR AVS SNAPSHOT
After Visit Summary   12/13/2017    Neelima Gardner    MRN: [id number]           Patient Information     Date Of Birth          1970        Visit Information        Provider Department      12/13/2017 9:40 AM Sonali Melendrez PA-C [facility] [facility]        Today's Diagnoses     Paresthesias    -  1      Care Instructions    Referral for neurology-  Sign Release of Information - our [facility] ( consult, EMG ) as well as MRIs (brain and Cspine) and labs to go to [facility].  Then have [facility] send their reports to us  Will recheck blood work today               Follow-ups after your visit        Additional Services     NEUROLOGY ADULT REFERRAL       Your provider has referred you for the following:   Consult at [facility]: [facility] [phone]   [email or web address]     Please be aware that coverage of these services is subject to the terms and limitations of your health insurance plan.  Call member services at your health plan with any benefit or coverage questions.      Please bring the following with you to your appointment:    (1) Any X-Rays, CTs or MRIs which have been performed.  Contact the facility where they were done to arrange for  prior to your scheduled appointment.    (2) List of current medications  (3) This referral request   (4) Any documents/labs given to you for this referral                  Who to contact     If you have questions or need follow up information about today's clinic visit or your schedule please contact [facility] directly at [phone].  Normal or non-critical lab and imaging results will be communicated to you by MyChart, letter or phone within 4 business days after the clinic has received the results. If you do not hear from us within 7 days, please contact the clinic through MyChart or phone. If you have a critical or abnormal lab  "result, we will notify you by phone as soon as possible.  Submit refill requests through [email or web address] or call your pharmacy and they will forward the refill request to us. Please allow 3 business days for your refill to be completed.          Additional Information About Your Visit        [employer] Information     [email or web address] gives you secure access to your electronic health record. If you see a primary care provider, you can also send messages to your care team and make appointments. If you have questions, please call your primary care clinic.  If you do not have a primary care provider, please call [phone] and they will assist you.        Care EveryWhere ID     This is your Care EveryWhere ID. This could be used by other organizations to access your [place] medical records  [id number]        Your Vitals Were     Pulse Temperature Respirations Height Pulse Oximetry BMI (Body Mass Index)    101 99.1  F (37.3  C) (Temporal) 16 5' 4.5\" (1.638 m) 98% 31.37 kg/m2       Blood Pressure from Last 3 Encounters:   12/13/17 130/83   09/11/17 134/82   03/09/17 120/86    Weight from Last 3 Encounters:   12/13/17 185 lb 9.6 oz (84.2 kg)   09/11/17 186 lb 14.4 oz (84.8 kg)   03/09/17 203 lb (92.1 kg)              We Performed the Following     CBC with platelets     Comprehensive metabolic panel (BMP + Alb, Alk Phos, ALT, AST, Total. Bili, TP)     NEUROLOGY ADULT REFERRAL     TSH with free T4 reflex     Vitamin B12          Today's Medication Changes          These changes are accurate as of: 12/13/17  9:58 AM.  If you have any questions, ask your nurse or doctor.               Stop taking these medicines if you haven't already. Please contact your care team if you have questions.     azithromycin 250 MG tablet   Commonly known as:  ZITHROMAX   Stopped by:  Sonali Melendrez PA-C           predniSONE 20 MG tablet   Commonly known as:  DELTASONE   Stopped by:  Sonali Melendrez PA-C           varenicline 0.5 MG X 11 & 1 MG X 42 " tablet   Commonly known as:  CHANTIX STARTING MONTH PAK   Stopped by:  Sonali Melendrez PA-C                    Primary Care Provider Office Phone # Fax #    Vani Yang [phone] [address] [facility] [address]        Equal Access to Services     [facility]: Hadii aad ku hadasho Soomaali, waaxda luqadaha, qaybta kaalmada adeegyada, waxay idiin hayaan adeeg kharash la'aan . So [facility] [phone].    ATENCIÓN: Si habla español, tiene a winter disposición servicios gratuitos de asistencia lingüística. [facility] [phone].    We comply with applicable federal civil rights laws and [place] laws. We do not discriminate on the basis of race, color, national origin, age, disability, sex, sexual orientation, or gender identity.            Thank you!     Thank you for choosing [facility]  for your care. Our goal is always to provide you with excellent care. Hearing back from our patients is one way we can continue to improve our services. Please take a few minutes to complete the written survey that you may receive in the mail after your visit with us. Thank you!             Your Updated Medication List - Protect others around you: Learn how to safely use, store and throw away your medicines at www.disposemymeds.org.          This list is accurate as of: 12/13/17  9:58 AM.  Always use your most recent med list.                   Brand Name Dispense Instructions for use Diagnosis    ADVIL PO      Take 200 mg by mouth every 6 hours as needed for moderate pain        albuterol 108 (90 BASE) MCG/ACT Inhaler    PROAIR HFA/PROVENTIL HFA/VENTOLIN HFA    1 Inhaler    Inhale 2 puffs into the lungs every 6 hours as needed for shortness of breath / dyspnea or wheezing    Cough, Acute bronchitis, unspecified organism       ALLEGRA PO      Take by mouth as needed for allergies        clindamycin 1 % solution    CLEOCIN T     Reported on 3/9/2017    Throat pain       EXCEDRIN PO       PRN        ipratropium - albuterol 0.5 mg/2.5 mg/3 mL 0.5-2.5 (3) MG/3ML neb solution    DUONEB    1 vial    Take 1 vial (3 mLs) by nebulization once for 1 dose    Cough

## 2017-12-18 ENCOUNTER — TELEPHONE (OUTPATIENT)
Dept: FAMILY MEDICINE | Facility: CLINIC | Age: 47
End: 2017-12-18

## 2017-12-18 DIAGNOSIS — R20.2 PARESTHESIAS: Primary | ICD-10-CM

## 2018-01-17 ENCOUNTER — TRANSFERRED RECORDS (OUTPATIENT)
Dept: HEALTH INFORMATION MANAGEMENT | Facility: CLINIC | Age: 48
End: 2018-01-17

## 2018-01-18 ENCOUNTER — THERAPY VISIT (OUTPATIENT)
Dept: PHYSICAL THERAPY | Facility: CLINIC | Age: 48
End: 2018-01-18
Payer: COMMERCIAL

## 2018-01-18 DIAGNOSIS — M79.601 PAIN OF RIGHT UPPER EXTREMITY: Primary | ICD-10-CM

## 2018-01-18 PROCEDURE — 97110 THERAPEUTIC EXERCISES: CPT | Mod: GP | Performed by: PHYSICAL THERAPIST

## 2018-01-18 PROCEDURE — 97140 MANUAL THERAPY 1/> REGIONS: CPT | Mod: GP | Performed by: PHYSICAL THERAPIST

## 2018-01-18 PROCEDURE — 97161 PT EVAL LOW COMPLEX 20 MIN: CPT | Mod: GP | Performed by: PHYSICAL THERAPIST

## 2018-01-18 NOTE — MR AVS SNAPSHOT
After Visit Summary   1/18/2018    Neelima Gardner    MRN: [id number]           Patient Information     Date Of Birth          1970        Visit Information        Provider Department      1/18/2018 2:00 PM Jahaira Sales, PT [facility]        Today's Diagnoses     Pain of right upper extremity    -  1       Follow-ups after your visit        Your next 10 appointments already scheduled     Jan 22, 2018  9:40 AM CST   LILI Extremity with Jahaira S Mónica, PT   [facility] ([facility]  )    [address]  [facility] [address]   [phone]            Jan 24, 2018 10:40 AM CST   LILI Extremity with Jahaira S Mónica, PT   [facility] ([facility]  )    [address]  [facility] [address]   [phone]            Jan 29, 2018  9:40 AM CST   LILI Extremity with Jahaira S Aguirre, PT   [facility] ([facility]  )    [address]  [facility] [address]   [phone]            Jan 31, 2018  1:20 PM CST   LILI Extremity with Jahaira S Mónica, PT   [facility] ([facility]  )    [address]  [facility] [address]   [phone]              Who to contact     If you have questions or need follow up information about today's clinic visit or your schedule please contact [facility] directly at [phone].  Normal or non-critical lab and imaging results will be communicated to you by MyChart, letter or phone within 4 business days after the clinic has received the results. If you do not hear from us within 7 days, please contact the clinic through MyChart or phone. If you have a critical or abnormal lab result, we will notify you by phone as soon as possible.  Submit refill requests through [employer] or call your pharmacy and they will forward  the refill request to us. Please allow 3 business days for your refill to be completed.          Additional Information About Your Visit        MyChart Information     [employer] gives you secure access to your electronic health record. If you see a primary care provider, you can also send messages to your care team and make appointments. If you have questions, please call your primary care clinic.  If you do not have a primary care provider, please call [phone] and they will assist you.        Care EveryWhere ID     This is your Care EveryWhere ID. This could be used by other organizations to access your [place] medical records  [id number]         Blood Pressure from Last 3 Encounters:   12/13/17 130/83   09/11/17 134/82   03/09/17 120/86    Weight from Last 3 Encounters:   12/13/17 84.2 kg (185 lb 9.6 oz)   09/11/17 84.8 kg (186 lb 14.4 oz)   03/09/17 92.1 kg (203 lb)              We Performed the Following     HC PT EVAL, LOW COMPLEXITY     LILI INITIAL EVAL REPORT     MANUAL THER TECH,1+REGIONS,EA 15 MIN     THERAPEUTIC EXERCISES        Primary Care Provider Office Phone # Fax #    Vani Yang [phone] [address] [facility] [address]        Equal Access to Services     CARLO SMITH : Hadii aad ku hadasho Soomaali, waaxda luqadaha, qaybta kaalmada adeegyada, waxay idiin hayaan mickeyeg khpinky la'napoleon barbosa. So [facility] [phone].    ATENCIÓN: Si habla español, tiene a winter disposición servicios gratuitos de asistencia lingüística. Llame al [phone].    We comply with applicable federal civil rights laws and [place] laws. We do not discriminate on the basis of race, color, national origin, age, disability, sex, sexual orientation, or gender identity.            Thank you!     Thank you for choosing [facility]  for your care. Our goal is always to provide you with excellent care. Hearing back from our patients is one way we can continue to  improve our services. Please take a few minutes to complete the written survey that you may receive in the mail after your visit with us. Thank you!             Your Updated Medication List - Protect others around you: Learn how to safely use, store and throw away your medicines at www.disposemymeds.org.          This list is accurate as of: 1/18/18  3:22 PM.  Always use your most recent med list.                   Brand Name Dispense Instructions for use Diagnosis    ADVIL PO      Take 200 mg by mouth every 6 hours as needed for moderate pain        albuterol 108 (90 BASE) MCG/ACT Inhaler    PROAIR HFA/PROVENTIL HFA/VENTOLIN HFA    1 Inhaler    Inhale 2 puffs into the lungs every 6 hours as needed for shortness of breath / dyspnea or wheezing    Cough, Acute bronchitis, unspecified organism       ALLEGRA PO      Take by mouth as needed for allergies        clindamycin 1 % solution    CLEOCIN T     Reported on 3/9/2017    Throat pain       EXCEDRIN PO      PRN        ipratropium - albuterol 0.5 mg/2.5 mg/3 mL 0.5-2.5 (3) MG/3ML neb solution    DUONEB    1 vial    Take 1 vial (3 mLs) by nebulization once for 1 dose    Cough

## 2018-01-18 NOTE — LETTER
"[facility]  [address]  [facility] [address]  [phone]    2018    Re: Neelima Gardnre   :   1970  MRN:  [id number]   REFERRING PHYSICIAN:   Gonsalo Anguiano    [facility]    Date of Initial Evaluation:  2018  Visits:  Rxs Used: 1  Reason for Referral:  Pain of right upper extremity    EVALUATION SUMMARY    [facility] Initial Evaluation -- Cervical    Evaluation Date: 2018  Neelima Gardner is a 47 year old female with a cervical condition.   Referral: 18  Work mechanical stresses: driving   Employment status: Sales  Leisure mechanical stresses:   Functional disability score (NDI):    VAS score (0-10): 210  Patient goals/expectations:  Abolish/decrease symptoms  HISTORY:  Present symptoms:  \"Buzzing\", tingling R hand .  Pain quality (sharp/shooting/stabbing/aching/burning/cramping):   buzzing.  Paresthesia (yes/no):  Yes, tingling and numbness R hand  Present since (onset date): Summer 2016.     Symptoms (improving/unchanging/worsening):  Worsening in December.  Symptoms commenced as a result of: no apparent reason   Condition occurred in the following environment:  home  Symptoms at onset (neck/arm/forearm/headache): hand, forearm  Constant symptoms (neck/arm/forearm/headache): hand  Intermittent symptoms (neck/arm/forearm/headache): forearm, neck stiffness  Symptoms are made worse with the following: No specific triggers.  When symptoms present, writing, applying makeup, and fastening buttons difficult.   Symptoms are made better with the following: Unknown  Disturbed sleep (yes/no): no  Number of pillows: 1  Sleeping postures (prone/sup/side R/L): side  Previous episodes (0/1-5/6-10/+): 0 Year of first episode:   Previous history: no history of similar sx  Previous treatments: None.  Patient has had multiple tests-blood work, EMG, c-spine MRI, brain MRI.  All " testing has been negative.          Re: Neelima Gardner   :   1970    Specific Questions: (as reported by the patient)  Dizziness/Tinnitus/Nausea/Swallowing (pos/neg): neg  Gait/Upper Limbs (normal/abnormal): normal  Medications (nil/NSAIDS/anlag/steroids/anticoag/other):  None  Medical allergies:  Codeine, Sulfa drugs  General health (excellent/good/fair/poor):  good  Pertinent medical history:  Overweight and Smoking  Imaging (None/Xray/MRI/Other):  MRI  Recent or major surgery (yes/no): no  Night pain (yes/no): no  Accidents (yes/no): no  Unexplained weight loss (yes/no): no  Barriers at home: no  Other red flags: no  EXAMINATION  Posture:   Sitting (good/fair/poor): fair  Standing (good/fair/poor): fair   Protruded head (yes/no): yes    Wry Neck (right/left/nil):  Nil   Relevant (yes/no):     Correction of posture(better/worse/no effect): no effect  Other observations:  Shoulder AROM: full and equal B.  MMT: strong and equal B UE.   strength: R-75#, 72#, 75#.  L-75#, 80#, 75#.  Pt. R hand dominant.  Neurological:  Motor Deficit:  See above  Reflexes:  n/t  Sensory Deficit:  Dec R hand Dural signs:  (-)ulnar nerve, (-)radial nerve, (+)median nerve R  Movement Loss:   Arnaldo Mod Min Nil Pain   Protrusion    x NE   Flexion    x NE   Retraction   x  NE   Extension   x  NE   Lateral flexion R    x NE   Lateral flexion L   x  Prod pain R neck/UT   Rotation R    x NE   Rotation L    x NE   Test Movements:   During: produces, abolishes, increases, decreases, no effect, centralizing, peripheralizing  After: better, worse, no better, no worse, no effect, centralized, peripheralized                    Re: Neelima Gardner   :   1970      Pretest symptoms sittin/10 R hand buzzing/tingling   Symptoms During Symptoms After ROM increased ROM decreased No Effect   PRO No Effect    No Effect         Rep PRO          RET No Effect    No Effect      Rep RET No Effect    Better    X   Inc L SB     RET EXT No Effect    No  Effect         Rep RET EXT Increases    Worse         Pretest symptoms lying:     Symptoms During Symptoms After ROM increased ROM decreased No Effect   RET          Rep RET          RET EXT          Rep RET EXT          If required, pretest symptoms sitting:      Symptoms During Symptoms After ROM increased ROM decreased No Effect   LF-R          Rep LF-R          LF-L Prod L neck sx  No Worse       Rep LF-L          ROT-R          Rep ROT-R          ROT-L          Rep ROT-L          FLEX          Rep FLEX              Static Tests:   Protrusion:    Flexion:    Retraction:    Extension (sitting/prone/supine):    Other Tests:   Provisional Classification:  Derangement - Asymmetrical, unilateral, symptoms below elbow  Principle of Management:  Education:  Posture    Equipment provided:  Lumbar roll  Mechanical therapy (Y/N):  Y-assessing   Extension principle:  Ret x10 6-8x/day Lateral principle:    Flexion principle:      Other:      Re: Neelima Gardner   :   1970    ASSESSMENT/PLAN:  Patient is a 47 year old female with cervical complaints.    Patient has the following significant findings with corresponding treatment plan.                Diagnosis 1:  R arm pain  Pain -  manual therapy, directional preference exercise and home program  Decreased ROM/flexibility - manual therapy, therapeutic exercise and home program  Decreased strength - therapeutic exercise, therapeutic activities and home program  Decreased function - therapeutic activities and home program  Therapy Evaluation Codes:   1) History comprised of:   Personal factors that impact the plan of care:      Time since onset of symptoms.    Comorbidity factors that impact the plan of care are:      None.     Medications impacting care: None.  2) Examination of Body Systems comprised of:   Body structures and functions that impact the plan of care:      Cervical spine.   Activity limitations that impact the plan of care are:      Dressing.  3) Clinical  presentation characteristics are:   Stable/Uncomplicated.  4) Decision-Making    Low complexity using standardized patient assessment instrument and/or measureable assessment of functional outcome.  Cumulative Therapy Evaluation is: Low complexity.  Previous and current functional limitations:  (See Goal Flow Sheet for this information)    Short term and Long term goals: (See Goal Flow Sheet for this information)   Communication ability:  Patient appears to be able to clearly communicate and understand verbal and written communication and follow directions correctly.  Treatment Explanation - The following has been discussed with the patient:   RX ordered/plan of care  Anticipated outcomes  Possible risks and side effects  This patient would benefit from PT intervention to resume normal activities.   Rehab potential is good.  Frequency:  2 X week, once daily  Duration:  for 3 weeks tapering to 1 X a week over 6 weeks  Discharge Plan:  Achieve all LTG.  Independent in home treatment program.  Reach maximal therapeutic benefit.     Thank you for your referral.    INQUIRIES  Therapist: Jahaira Sales, PT   [facility]  [address]  [facility] [address]  Phone: [phone]  Fax: [phone]

## 2018-01-18 NOTE — PROGRESS NOTES
"[facility] Initial Evaluation -- Cervical    Evaluation Date: January 18, 2018  Neelima Gardner is a 47 year old female with a cervical condition.   Referral: 1/17/18  Work mechanical stresses: driving   Employment status: Sales  Leisure mechanical stresses:   Functional disability score (NDI):    VAS score (0-10): 2/10  Patient goals/expectations:  Abolish/decrease symptoms    HISTORY:    Present symptoms:  \"Buzzing\", tingling R hand .  Pain quality (sharp/shooting/stabbing/aching/burning/cramping):   buzzing.  Paresthesia (yes/no):  Yes, tingling and numbness R hand    Present since (onset date): Summer 2016.     Symptoms (improving/unchanging/worsening):  Worsening in December.    Symptoms commenced as a result of: no apparent reason   Condition occurred in the following environment:  home    Symptoms at onset (neck/arm/forearm/headache): hand, forearm  Constant symptoms (neck/arm/forearm/headache): hand  Intermittent symptoms (neck/arm/forearm/headache): forearm, neck stiffness    Symptoms are made worse with the following: No specific triggers.  When symptoms present, writing, applying makeup, and fastening buttons difficult.   Symptoms are made better with the following: Unknown    Disturbed sleep (yes/no): no  Number of pillows: 1  Sleeping postures (prone/sup/side R/L): side    Previous episodes (0/1-5/6-10/11+): 0 Year of first episode: 2016    Previous history: no history of similar sx  Previous treatments: None.  Patient has had multiple tests-blood work, EMG, c-spine MRI, brain MRI.  All testing has been negative.    Specific Questions: (as reported by the patient)  Dizziness/Tinnitus/Nausea/Swallowing (pos/neg): neg  Gait/Upper Limbs (normal/abnormal): normal  Medications (nil/NSAIDS/anlag/steroids/anticoag/other):  None  Medical allergies:  Codeine, Sulfa drugs  General health (excellent/good/fair/poor):  good  Pertinent medical history:  Overweight and Smoking  Imaging " (None/Xray/MRI/Other):  MRI  Recent or major surgery (yes/no): no  Night pain (yes/no): no  Accidents (yes/no): no  Unexplained weight loss (yes/no): no  Barriers at home: no  Other red flags: no    EXAMINATION    Posture:   Sitting (good/fair/poor): fair  Standing (good/fair/poor): fair     Protruded head (yes/no): yes    Wry Neck (right/left/nil):  Nil   Relevant (yes/no):       Correction of posture(better/worse/no effect): no effect  Other observations:  Shoulder AROM: full and equal B.  MMT: strong and equal B UE.   strength: R-75#, 72#, 75#.  L-75#, 80#, 75#.  Pt. R hand dominant.    Neurological:    Motor Deficit:  See above  Reflexes:  n/t  Sensory Deficit:  Dec R hand Dural signs:  (-)ulnar nerve, (-)radial nerve, (+)median nerve R    Movement Loss:   Arnaldo Mod Min Nil Pain   Protrusion    x NE   Flexion    x NE   Retraction   x  NE   Extension   x  NE   Lateral flexion R    x NE   Lateral flexion L   x  Prod pain R neck/UT   Rotation R    x NE   Rotation L    x NE     Test Movements:   During: produces, abolishes, increases, decreases, no effect, centralizing, peripheralizing  After: better, worse, no better, no worse, no effect, centralized, peripheralized    Pretest symptoms sittin/10 R hand buzzing/tingling   Symptoms During Symptoms After ROM increased ROM decreased No Effect   PRO No Effect    No Effect         Rep PRO          RET No Effect    No Effect      Rep RET No Effect    Better    X   Inc L SB     RET EXT No Effect    No Effect         Rep RET EXT Increases    Worse         Pretest symptoms lying:     Symptoms During Symptoms After ROM increased ROM decreased No Effect   RET          Rep RET          RET EXT          Rep RET EXT          If required, pretest symptoms sitting:      Symptoms During Symptoms After ROM increased ROM decreased No Effect   LF-R          Rep LF-R          LF-L Prod L neck sx  No Worse       Rep LF-L          ROT-R          Rep ROT-R          ROT-L          Rep  ROT-L          FLEX          Rep FLEX              Static Tests:   Protrusion:    Flexion:    Retraction:    Extension (sitting/prone/supine):      Other Tests:     Provisional Classification:  Derangement - Asymmetrical, unilateral, symptoms below elbow    Principle of Management:  Education:  Posture    Equipment provided:  Lumbar roll  Mechanical therapy (Y/N):  Y-assessing   Extension principle:  Ret x10 6-8x/day Lateral principle:    Flexion principle:      Other:      ASSESSMENT/PLAN:    Patient is a 47 year old female with cervical complaints.    Patient has the following significant findings with corresponding treatment plan.                Diagnosis 1:  R arm pain  Pain -  manual therapy, directional preference exercise and home program  Decreased ROM/flexibility - manual therapy, therapeutic exercise and home program  Decreased strength - therapeutic exercise, therapeutic activities and home program  Decreased function - therapeutic activities and home program    Therapy Evaluation Codes:   1) History comprised of:   Personal factors that impact the plan of care:      Time since onset of symptoms.    Comorbidity factors that impact the plan of care are:      None.     Medications impacting care: None.  2) Examination of Body Systems comprised of:   Body structures and functions that impact the plan of care:      Cervical spine.   Activity limitations that impact the plan of care are:      Dressing.  3) Clinical presentation characteristics are:   Stable/Uncomplicated.  4) Decision-Making    Low complexity using standardized patient assessment instrument and/or measureable assessment of functional outcome.  Cumulative Therapy Evaluation is: Low complexity.    Previous and current functional limitations:  (See Goal Flow Sheet for this information)    Short term and Long term goals: (See Goal Flow Sheet for this information)     Communication ability:  Patient appears to be able to clearly communicate and  understand verbal and written communication and follow directions correctly.  Treatment Explanation - The following has been discussed with the patient:   RX ordered/plan of care  Anticipated outcomes  Possible risks and side effects  This patient would benefit from PT intervention to resume normal activities.   Rehab potential is good.    Frequency:  2 X week, once daily  Duration:  for 3 weeks tapering to 1 X a week over 6 weeks  Discharge Plan:  Achieve all LTG.  Independent in home treatment program.  Reach maximal therapeutic benefit.    Please refer to the daily flowsheet for treatment today, total treatment time and time spent performing 1:1 timed codes.

## 2018-01-22 ENCOUNTER — THERAPY VISIT (OUTPATIENT)
Dept: PHYSICAL THERAPY | Facility: CLINIC | Age: 48
End: 2018-01-22
Payer: COMMERCIAL

## 2018-01-22 DIAGNOSIS — M79.601 PAIN OF RIGHT UPPER EXTREMITY: ICD-10-CM

## 2018-01-22 PROCEDURE — 97140 MANUAL THERAPY 1/> REGIONS: CPT | Mod: GP | Performed by: PHYSICAL THERAPIST

## 2018-01-22 PROCEDURE — 97110 THERAPEUTIC EXERCISES: CPT | Mod: GP | Performed by: PHYSICAL THERAPIST

## 2018-01-24 ENCOUNTER — THERAPY VISIT (OUTPATIENT)
Dept: PHYSICAL THERAPY | Facility: CLINIC | Age: 48
End: 2018-01-24
Payer: COMMERCIAL

## 2018-01-24 DIAGNOSIS — M79.601 PAIN OF RIGHT UPPER EXTREMITY: ICD-10-CM

## 2018-01-24 PROCEDURE — 97140 MANUAL THERAPY 1/> REGIONS: CPT | Mod: GP | Performed by: PHYSICAL THERAPIST

## 2018-01-24 PROCEDURE — 97110 THERAPEUTIC EXERCISES: CPT | Mod: GP | Performed by: PHYSICAL THERAPIST

## 2018-01-26 ENCOUNTER — TELEPHONE (OUTPATIENT)
Dept: FAMILY MEDICINE | Facility: CLINIC | Age: 48
End: 2018-01-26

## 2018-01-26 NOTE — TELEPHONE ENCOUNTER
1/26/2018    Call Regarding Preventive Health Screening Cervical/PAP    Attempt 1    Message     Comments: PATIENT STATES SHE GOES ELSEWHERE , NO OTHER INFO GIVEN          Outreach   AT

## 2018-01-31 ENCOUNTER — THERAPY VISIT (OUTPATIENT)
Dept: PHYSICAL THERAPY | Facility: CLINIC | Age: 48
End: 2018-01-31
Payer: COMMERCIAL

## 2018-01-31 DIAGNOSIS — M79.601 PAIN OF RIGHT UPPER EXTREMITY: ICD-10-CM

## 2018-01-31 PROCEDURE — 97140 MANUAL THERAPY 1/> REGIONS: CPT | Mod: GP | Performed by: PHYSICAL THERAPIST

## 2018-01-31 PROCEDURE — 97110 THERAPEUTIC EXERCISES: CPT | Mod: GP | Performed by: PHYSICAL THERAPIST

## 2018-02-07 ENCOUNTER — THERAPY VISIT (OUTPATIENT)
Dept: PHYSICAL THERAPY | Facility: CLINIC | Age: 48
End: 2018-02-07
Payer: COMMERCIAL

## 2018-02-07 DIAGNOSIS — M79.601 PAIN OF RIGHT UPPER EXTREMITY: ICD-10-CM

## 2018-02-07 PROCEDURE — 97140 MANUAL THERAPY 1/> REGIONS: CPT | Mod: GP | Performed by: PHYSICAL THERAPIST

## 2018-02-07 PROCEDURE — 97110 THERAPEUTIC EXERCISES: CPT | Mod: GP | Performed by: PHYSICAL THERAPIST

## 2018-02-15 ENCOUNTER — TELEPHONE (OUTPATIENT)
Dept: FAMILY MEDICINE | Facility: CLINIC | Age: 48
End: 2018-02-15

## 2018-02-15 ENCOUNTER — OFFICE VISIT (OUTPATIENT)
Dept: FAMILY MEDICINE | Facility: CLINIC | Age: 48
End: 2018-02-15
Payer: COMMERCIAL

## 2018-02-15 VITALS
SYSTOLIC BLOOD PRESSURE: 130 MMHG | TEMPERATURE: 98.6 F | DIASTOLIC BLOOD PRESSURE: 76 MMHG | RESPIRATION RATE: 16 BRPM | HEART RATE: 80 BPM | HEIGHT: 65 IN | WEIGHT: 181.7 LBS | BODY MASS INDEX: 30.27 KG/M2

## 2018-02-15 DIAGNOSIS — Z23 NEED FOR VACCINATION FOR DTAP: ICD-10-CM

## 2018-02-15 DIAGNOSIS — R51.9 NONINTRACTABLE HEADACHE, UNSPECIFIED CHRONICITY PATTERN, UNSPECIFIED HEADACHE TYPE: Primary | ICD-10-CM

## 2018-02-15 PROCEDURE — 90715 TDAP VACCINE 7 YRS/> IM: CPT | Performed by: NURSE PRACTITIONER

## 2018-02-15 PROCEDURE — 90471 IMMUNIZATION ADMIN: CPT | Performed by: NURSE PRACTITIONER

## 2018-02-15 PROCEDURE — 99213 OFFICE O/P EST LOW 20 MIN: CPT | Mod: 25 | Performed by: NURSE PRACTITIONER

## 2018-02-15 RX ORDER — HYDROXYZINE HYDROCHLORIDE 25 MG/1
25-50 TABLET, FILM COATED ORAL
Qty: 30 TABLET | Refills: 0 | Status: SHIPPED | OUTPATIENT
Start: 2018-02-15 | End: 2018-04-26

## 2018-02-15 ASSESSMENT — PAIN SCALES - GENERAL: PAINLEVEL: MILD PAIN (2)

## 2018-02-15 NOTE — PROGRESS NOTES
SUBJECTIVE:   Neelima Gardner is a 47 year old female who presents to clinic today for the following health issues: Headache that has been ongoing for 5-6 weeks. Pt rates pain a 4/10 scale and that the headache is always there and mostly in the right frontal area. Pt states nothing seems to make it better including Excedrin that in the past has helped with headaches. States that coughing can make the pain worse but that there is no blurry vision or loss of vision. Stated that she thinks her double vision has gotten worse in the right eye but that she has a history of double vision and this is why she wears contacts. Pt reports that she is currently doing to PT for right hand numbness that has been ongoing for 2 years, cervical nerve impingement. She has seen neurology for this at the [facility] and they offered PT. She has a follow up with neurology March 1st and will discuss further treatment for hand and ask about treatment for headaches as well. Pt reports eating is good and exercises 2-3 times per week playing Sumeet and volleyball. Pt reports some life stressors right now and a possible separation from her , she states this has been ongoing for years but that the relationship is falling apart. Sleep schedule she report falling asleep ok but that she wakes up usually 1 or 2 am and cannot get back to sleep. Pt report about 3-5 hours of sleep per night. No recent illness or injury.       History of Present Illness     Migraines:     Headache Symptoms are:  Stable    Migraine frequency::  5 per week    Migraine Duration::  >3 hours    Ability to perform ADL's::  Yes    Migraine Rescue/Relief Medications::  Excedrin    Effectiveness of rescue/relief medications::  No relief    Migraine Preventative Medications::  None    Neurological symptoms::  Numbness and Loss of vision    ER or UC Visits::  None    Diet:  Regular (no restrictions)  Frequency of exercise:  2-3 days/week  Duration of exercise:  45-60  minutes  Taking medications regularly:  Not Applicable  Medication side effects:  Not applicable  Additional concerns today:  YES    Headache  Onset: At least one month    Description:   Location:  frontal area   Character: dull pain  Frequency:  Every morning  Duration:  All day    Intensity: mild    Progression of Symptoms:  worsening    Accompanying Signs & Symptoms:  Stiff neck: YES  Neck or upper back pain: no   Fever: no   Sinus pressure: no   Nausea or vomiting: YES  Dizziness: YES  Numbness: YES  Weakness: no   Visual changes: YES    History:   Head trauma: no   Family history of migraines: no   Previous tests for headaches: no   Neurologist evaluations: no   Able to do daily activities: YES  Wake with a headaches: YES  Do headaches wake you up: no   Daily pain medication use: no   Work/school stressors/changes: YES- possibly    Precipitating factors:   Does light make it worse: no   Does sound make it worse: no     Alleviating factors:  Does sleep help: no     Therapies Tried and outcome: Excedrin  Problem list and histories reviewed & adjusted, as indicated.  Additional history: as documented    Headaches      Duration: 5-6 weeks    Description  Location: unilateral in the right frontal area   Character: dull pain  Frequency:  everyday  Duration:  All day     Intensity:  moderate    Accompanying signs and symptoms:    Precipitating or Alleviating factors:  Nausea/vomiting: no  Dizziness: no  Weakness or numbness: no  Visual changes: none  Fever: no   Sinus or URI symptoms no     History  Head trauma: no   Family history of migraines: no   Previous tests for headaches: YES  Neurologist evaluations: YES  Able to do daily activities when headache present: YES  Wake with headaches: YES  Daily pain medication use: YES  Any changes in: relationship, possible separation from      Precipitating or Alleviating factors (light/sound/sleep/caffeine): Coughing seems to make pain worse. Nothing at this time seems  to help    Therapies tried and outcome: Excedrin    Outcome - usually effective, but doesn't seem to be helping right now  Frequent/daily pain medication use: YES    Charles Oneal DNP-FNP student  Acting as scribe.     Patient Active Problem List   Diagnosis     CARDIOVASCULAR SCREENING; LDL GOAL LESS THAN 160     Common wart     Pain of right upper extremity     Past Surgical History:   Procedure Laterality Date     C/SECTION, LOW TRANSVERSE      , Low Transverse       Social History   Substance Use Topics     Smoking status: Light Tobacco Smoker     Packs/day: 1.00     Types: Cigarettes     Last attempt to quit: 2011     Smokeless tobacco: Never Used     Alcohol use Yes      Comment: Occ     Family History   Problem Relation Age of Onset     OSTEOPOROSIS Mother      DIABETES Father      Colon Cancer Paternal Aunt          Current Outpatient Prescriptions   Medication Sig Dispense Refill     hydrOXYzine (ATARAX) 25 MG tablet Take 1-2 tablets (25-50 mg) by mouth nightly as needed for itching 30 tablet 0     Fexofenadine HCl (ALLEGRA PO) Take by mouth as needed for allergies       EXCEDRIN OR PRN       ipratropium - albuterol 0.5 mg/2.5 mg/3 mL (DUONEB) 0.5-2.5 (3) MG/3ML neb solution Take 1 vial (3 mLs) by nebulization once for 1 dose 1 vial 0     albuterol (PROAIR HFA/PROVENTIL HFA/VENTOLIN HFA) 108 (90 BASE) MCG/ACT Inhaler Inhale 2 puffs into the lungs every 6 hours as needed for shortness of breath / dyspnea or wheezing (Patient not taking: Reported on 2017) 1 Inhaler 0     clindamycin (CLEOCIN T) 1 % solution Reported on 3/9/2017  3     Ibuprofen (ADVIL PO) Take 200 mg by mouth every 6 hours as needed for moderate pain       Allergies   Allergen Reactions     Codeine      Sulfa Drugs        ROS:  CONSTITUTIONAL:NEGATIVE for fever, chills, change in weight  INTEGUMENTARY/SKIN: NEGATIVE for worrisome rashes, moles or lesions  EYES: NEGATIVE for vision changes or irritation  ENT/MOUTH:  "NEGATIVE for ear, mouth and throat problems  RESP:NEGATIVE for significant cough or SOB  CV: NEGATIVE for chest pain, palpitations or peripheral edema  MUSCULOSKELETAL: NEGATIVE for significant arthralgias or myalgia  NEURO: NEGATIVE for weakness, dizziness or new paresthesias, has paresthesias of right hand, this has be ongoing for 2 years.   ENDOCRINE: NEGATIVE for temperature intolerance, skin/hair changes  PSYCHIATRIC: NEGATIVE for changes in mood or affect    OBJECTIVE:                                                    /76  Pulse 80  Temp 98.6  F (37  C) (Temporal)  Resp 16  Ht 5' 4.57\" (1.64 m)  Wt 181 lb 11.2 oz (82.4 kg)  Breastfeeding? No  BMI 30.64 kg/m2  Body mass index is 30.64 kg/(m^2).  GENERAL APPEARANCE: healthy, alert and no distress  EYES: Eyes grossly normal to inspection, PERRL, conjunctivae and sclerae normal, visual fields normal, pupils-equal, round and reactive to light and fundi-disc normal in appearance no vascular abnormalities. Sub optimal exam. EOM's intact, no signs of nystagmus.   HENT: ear canals and TM's normal and nose and mouth without ulcers or lesions  NECK: no adenopathy, no asymmetry, masses, or scars and thyroid normal to palpation. Normal flexion and extension.   CV: regular rates and rhythm, normal S1 S2, no S3 or S4 and no murmur, click or rub  LYMPHATICS: normal ant/post cervical and supraclavicular nodes  MS: extremities normal- no gross deformities noted  NEURO: Normal strength and tone, sensory exam grossly normal, mentation intact and speech normal. Radial reflex normal. CN- II-XII tested and intact, normal Romberg, heel-toe- normal, cerebellar testing normal. Normal peripheral fields  PSYCH: mentation appears normal and affect normal/bright  MENTAL STATUS EXAM:  Appearance/Behavior: No apparent distress, Neatly groomed and Appears stated age  Speech: Normal  Mood/Affect: normal affect  Insight: Adequate       ASSESSMENT/PLAN:                              "                           ICD-10-CM    1. Nonintractable headache, unspecified chronicity pattern, unspecified headache type R51 hydrOXYzine (ATARAX) 25 MG tablet   2. Need for vaccination for DTaP Z23 TDAP VACCINE (ADACEL)     ADMIN 1st VACCINE     Assessment:   (1) Non intractable Headache    Plan:  (1) Start Hydroxyzine 25-50 mg 1-2 tablets PO for sleep.     Discussed using Hydroxyzine for sleep. Discussed possible side effects of medication and how to use. Discussed that this medication can make you tired and cause drowsiness so don't drive or take before going to work.     Discussed possible causes of headache which consist of lack of sleep, life stressors, referred pain from neck pain. Discussed home cares of using tylenol, ibuprofen alternating between the two. Discussed using PT to help with headaches, pt. declined. Discussed that only getting 3 hours of sleep most nights is most likely cause to headaches. Discussed with patient that counseling could help with possible separation from her , pt refused this at this time. Pt was receptive to all discussions and had no questions or concerns.  Exam is reassuring/normal today. Prev. MRI on file. Has neurology f/u, could consider MRI in future.     Discussed warnings signs of facial droop, severe double vision or blurry vision, headache that is the worst headache ever experienced.  Pt was told to go to ER if any of these warning sings are present.     TDaP updated.     Follow up as needed, sooner if symptoms don't improve or worsen.             MARISA [facility]

## 2018-02-15 NOTE — MR AVS SNAPSHOT
After Visit Summary   2/15/2018    Neelima Gardner    MRN: [id number]           Patient Information     Date Of Birth          1970        Visit Information        Provider Department      2/15/2018 2:40 PM Kizzy Kramer APRN CNP [facility]        Today's Diagnoses     Nonintractable headache, unspecified chronicity pattern, unspecified headache type    -  1    Need for vaccination for DTaP           Follow-ups after your visit        Your next 10 appointments already scheduled     Feb 21, 2018  1:20 PM CST   LILI Extremity with Jahaira Sales, PT   [facility]  )    [address]  [facility] [address] [phone]              Who to contact     If you have questions or need follow up information about today's clinic visit or your schedule please contact [facility] [place] directly at [phone].  Normal or non-critical lab and imaging results will be communicated to you by Campanjahart, letter or phone within 4 business days after the clinic has received the results. If you do not hear from us within 7 days, please contact the clinic through Campanjahart or phone. If you have a critical or abnormal lab result, we will notify you by phone as soon as possible.  Submit refill requests through RentWiki or call your pharmacy and they will forward the refill request to us. Please allow 3 business days for your refill to be completed.          Additional Information About Your Visit        MyChart Information     [employer] gives you secure access to your electronic health record. If you see a primary care provider, you can also send messages to your care team and make appointments. If you have questions, please call your primary care clinic.  If you do not have a primary care provider, please call [phone] and they will assist you.        Care EveryWhere ID     This is your Care EveryWhere ID. This could be used by other  "organizations to access your [place] medical records  [id number]        Your Vitals Were     Pulse Temperature Respirations Height Breastfeeding? BMI (Body Mass Index)    80 98.6  F (37  C) (Temporal) 16 5' 4.57\" (1.64 m) No 30.64 kg/m2       Blood Pressure from Last 3 Encounters:   02/15/18 130/76   12/13/17 130/83   09/11/17 134/82    Weight from Last 3 Encounters:   02/15/18 181 lb 11.2 oz (82.4 kg)   12/13/17 185 lb 9.6 oz (84.2 kg)   09/11/17 186 lb 14.4 oz (84.8 kg)              We Performed the Following     ADMIN 1st VACCINE     TDAP VACCINE (ADACEL)          Today's Medication Changes          These changes are accurate as of 2/15/18  7:02 PM.  If you have any questions, ask your nurse or doctor.               Start taking these medicines.        Dose/Directions    hydrOXYzine 25 MG tablet   Commonly known as:  ATARAX   Used for:  Nonintractable headache, unspecified chronicity pattern, unspecified headache type   Started by:  Kizzy Kramer APRN CNP        Dose:  25-50 mg   Take 1-2 tablets (25-50 mg) by mouth nightly as needed for itching   Quantity:  30 tablet   Refills:  0            Where to get your medicines      These medications were sent to [id number] - [place] - [address]  [address], [place] [address]    Hours:  test fax sent successfully 1/20/04   Phone:  [phone]     hydrOXYzine 25 MG tablet                Primary Care Provider Office Phone # Fax #    Vani Yang [phone] [address] [facility]  [place] [address]        Equal Access to Services     [facility] LUIS AH: Hadii brittany Godinez, waaxda luqadaha, qaybta kaalmada adetiarrayada, marshall barbosa. So [place] [phone].    ATENCIÓN: Si habla español, tiene a winter disposición servicios gratuitos de asistencia lingüística. Llame al [phone].    We comply with applicable federal civil rights laws and [place] laws. " We do not discriminate on the basis of race, color, national origin, age, disability, sex, sexual orientation, or gender identity.            Thank you!     Thank you for choosing [facility]  for your care. Our goal is always to provide you with excellent care. Hearing back from our patients is one way we can continue to improve our services. Please take a few minutes to complete the written survey that you may receive in the mail after your visit with us. Thank you!             Your Updated Medication List - Protect others around you: Learn how to safely use, store and throw away your medicines at www.disposemymeds.org.          This list is accurate as of 2/15/18  7:02 PM.  Always use your most recent med list.                   Brand Name Dispense Instructions for use Diagnosis    ADVIL PO      Take 200 mg by mouth every 6 hours as needed for moderate pain        albuterol 108 (90 BASE) MCG/ACT Inhaler    PROAIR HFA/PROVENTIL HFA/VENTOLIN HFA    1 Inhaler    Inhale 2 puffs into the lungs every 6 hours as needed for shortness of breath / dyspnea or wheezing    Cough, Acute bronchitis, unspecified organism       ALLEGRA PO      Take by mouth as needed for allergies        clindamycin 1 % solution    CLEOCIN T     Reported on 3/9/2017    Throat pain       EXCEDRIN PO      PRN        hydrOXYzine 25 MG tablet    ATARAX    30 tablet    Take 1-2 tablets (25-50 mg) by mouth nightly as needed for itching    Nonintractable headache, unspecified chronicity pattern, unspecified headache type       ipratropium - albuterol 0.5 mg/2.5 mg/3 mL 0.5-2.5 (3) MG/3ML neb solution    DUONEB    1 vial    Take 1 vial (3 mLs) by nebulization once for 1 dose    Cough

## 2018-02-15 NOTE — PROGRESS NOTES
Screening Questionnaire for Adult Immunization  Answers for HPI/ROS submitted by the patient on 2/15/2018   Chronic problems general questions HPI Form  Diet:: Regular (no restrictions)  Frequency of exercise:: 2-3 days/week  Taking medications regularly:: Not Applicable  Medication side effects:: Not applicable  Additional concerns today:: YES  PHQ-2 Score: 0  Duration of exercise:: 45-60 minutes  Headache Symptoms are: Stable  Migraine frequency:: 5 per week  Migraine Duration:: >3 hours  Ability to perform ADL's:: Yes  Migraine Rescue/Relief Medications:: Excedrin  Effectiveness of rescue/relief medications:: No relief  Migraine Preventative Medications:: None  Neurological symptoms:: Numbness, Loss of vision  ER or UC Visits:: None    Are you sick today?   No   Do you have allergies to medications, food, a vaccine component or latex?   No   Have you ever had a serious reaction after receiving a vaccination?   No   Do you have a long-term health problem with heart disease, lung disease, asthma, kidney disease, metabolic disease (e.g. diabetes), anemia, or other blood disorder?   No   Do you have cancer, leukemia, HIV/AIDS, or any other immune system problem?   No   In the past 3 months, have you taken medications that affect  your immune system, such as prednisone, other steroids, or anticancer drugs; drugs for the treatment of rheumatoid arthritis, Crohn s disease, or psoriasis; or have you had radiation treatments?   No   Have you had a seizure, or a brain or other nervous system problem?   No   During the past year, have you received a transfusion of blood or blood     products, or been given immune (gamma) globulin or antiviral drug?   No   For women: Are you pregnant or is there a chance you could become        pregnant during the next month?   No   Have you received any vaccinations in the past 4 weeks?   No     Immunization questionnaire answers were all negative.        Per orders of aly Sandoval  of [employer] given by Ninfa Cardenas. Patient instructed to remain in clinic for 15 minutes afterwards, and to report any adverse reaction to me immediately.       Screening performed by Ninfa Cardenas on 2/15/2018 at 3:38 PM.

## 2018-02-15 NOTE — TELEPHONE ENCOUNTER
"Neelima Gardner is a 47 year old female who calls with headache.    NURSING ASSESSMENT:  Description:  I spoke with pt who states she has had a dull headache for 4-5 weeks. She also stated her vision is decreasing but stated she has a corneal growth problem and she is seen every 10 years to take care of that and it is not time yet. This morning when she was walking in her kitchen she felt off balance. Denies dizziness just \"off balance\". States this happens 1x week for the last few weeks also.  Onset/duration:  4-5 weeks  Precip. factors:  none  Associated symptoms:  No recent illnesses. Has been seeing PT for numbness in her hand. States this has been 2.5 years  Improves/worsens symptoms:  Excedrin does not help with headache  Pain Dull  *  Allergies:   Allergies   Allergen Reactions     Codeine      Sulfa Drugs        RECOMMENDED DISPOSITION:  See in 72 hours - Pt would like to be seen today  Will comply with recommendation: Yes     Next 5 appointments (look out 90 days)     Feb 15, 2018  2:40 PM CST   Office Visit with MARISA Gregory [facility]    [address], [address]  [place] [address] [phone]                  If further questions/concerns or if symptoms do not improve, worsen or new symptoms develop, call your PCP or [facility] as soon as possible.      Guideline used: Headache  Telephone Triage Protocols for Nurses, Fifth Edition, Isabel Yu RN    "

## 2018-02-21 ENCOUNTER — THERAPY VISIT (OUTPATIENT)
Dept: PHYSICAL THERAPY | Facility: CLINIC | Age: 48
End: 2018-02-21
Payer: COMMERCIAL

## 2018-02-21 DIAGNOSIS — M79.601 PAIN OF RIGHT UPPER EXTREMITY: ICD-10-CM

## 2018-02-21 PROCEDURE — 97140 MANUAL THERAPY 1/> REGIONS: CPT | Mod: GP | Performed by: PHYSICAL THERAPIST

## 2018-02-21 PROCEDURE — 97110 THERAPEUTIC EXERCISES: CPT | Mod: GP | Performed by: PHYSICAL THERAPIST

## 2018-02-21 NOTE — LETTER
[facility] PHYSICAL THERAPY  [address]  [place] [address]  [phone]    2018    Re: Neelima Gardner   :   1970  MRN:  [id number]   REFERRING PHYSICIAN:   Gonsalo Anguiano    [facility] PHYSICAL THERAPY  Date of Initial Evaluation: 18  Visits:  Rxs Used: 6  Reason for Referral:  Pain of right upper extremity    DISCHARGE REPORT  Progress reporting period is from 2018 to 2018.  Patient was seen for 6 visits.    SUBJECTIVE  Subjective: Patient reports good overall improvement.  Hand symptoms are now intermittent and mild when present.  Reports being able to write and apply makeup without difficulty.      Current Pain level: 10.     Initial Pain level: /10.     Changes in function:  Yes (See Goal flowsheet attached for changes in current functional level)    Adverse reaction to treatment or activity: None    OBJECTIVE    Objective: CROM: Demonstrates nil loss CROM in all directions.  End range tightness with L SB.  Strength: strong and equal MMT B.  Neural tension: mild tension on R with median nerve stretch.     ASSESSMENT/PLAN  Updated problem list and treatment plan:     Diagnosis 1:  Neck/R arm pain  Pain -  home program  Decreased ROM/flexibility - home program  Decreased strength - home program  Decreased function - home program    STG/LTGs have been met or progress has been made towards goals:  Yes (See Goal flow sheet completed today.)    Assessment of Progress: The patient has met all of their long term goals.    Self Management Plans:  Patient is independent in a home treatment program.    Neelima continues to require the following intervention to meet STG and LTG's:  PT intervention is no longer required to meet STG/LTG.    Recommendations:  This patient is ready to be discharged from therapy and continue their home treatment program.    Thank you for your referral.    INQUIRIES  Therapist: Jahaira Sales, PT, Cert.  [facility] PHYSICAL THERAPY  [address]  [facility] [address]  Phone: [phone]  Fax: [phone]

## 2018-02-21 NOTE — PROGRESS NOTES
Subjective:  HPI                    Objective:  System    Physical Exam    General     ROS    Assessment/Plan:    DISCHARGE REPORT    Progress reporting period is from 1/18/2018 to 2/21/2018.   Patient was seen for 6 visits.    SUBJECTIVE  Subjective: Patient reports good overall improvement.  Hand symptoms are now intermittent and mild when present.  Reports being able to write and apply makeup without difficulty.    Current Pain level: 1/10.     Initial Pain level: 2/10.   Changes in function:  Yes (See Goal flowsheet attached for changes in current functional level)  Adverse reaction to treatment or activity: None    OBJECTIVE    Objective: CROM: Demonstrates nil loss CROM in all directions.  End range tightness with L SB.  Strength: strong and equal MMT B.  Neural tension: mild tension on R with median nerve stretch.     ASSESSMENT/PLAN  Updated problem list and treatment plan: Diagnosis 1:  Neck/R arm pain  Pain -  home program  Decreased ROM/flexibility - home program  Decreased strength - home program  Decreased function - home program  STG/LTGs have been met or progress has been made towards goals:  Yes (See Goal flow sheet completed today.)  Assessment of Progress: The patient has met all of their long term goals.  Self Management Plans:  Patient is independent in a home treatment program.    Neelima continues to require the following intervention to meet STG and LTG's:  PT intervention is no longer required to meet STG/LTG.    Recommendations:  This patient is ready to be discharged from therapy and continue their home treatment program.    Please refer to the daily flowsheet for treatment today, total treatment time and time spent performing 1:1 timed codes.

## 2018-02-21 NOTE — MR AVS SNAPSHOT
After Visit Summary   2/21/2018    Neelima Gardner    MRN: [id number]           Patient Information     Date Of Birth          1970        Visit Information        Provider Department      2/21/2018 1:20 PM Jahaira Sales, PT [facility] Physical Therapy        Today's Diagnoses     Pain of right upper extremity           Follow-ups after your visit        Who to contact     If you have questions or need follow up information about today's clinic visit or your schedule please contact [facility] PHYSICAL THERAPY directly at [phone].  Normal or non-critical lab and imaging results will be communicated to you by S[employer], letter or phone within 4 business days after the clinic has received the results. If you do not hear from us within 7 days, please contact the clinic through WorkWith.met or phone. If you have a critical or abnormal lab result, we will notify you by phone as soon as possible.  Submit refill requests through [employer] or call your pharmacy and they will forward the refill request to us. Please allow 3 business days for your refill to be completed.          Additional Information About Your Visit        MyChart Information     [employer] gives you secure access to your electronic health record. If you see a primary care provider, you can also send messages to your care team and make appointments. If you have questions, please call your primary care clinic.  If you do not have a primary care provider, please call [phone] and they will assist you.        Care EveryWhere ID     This is your Care EveryWhere ID. This could be used by other organizations to access your [place] medical records  [id number]         Blood Pressure from Last 3 Encounters:   02/15/18 130/76   12/13/17 130/83   09/11/17 134/82    Weight from Last 3 Encounters:   02/15/18 82.4 kg (181 lb 11.2 oz)   12/13/17 84.2 kg (185 lb 9.6 oz)   09/11/17 84.8 kg (186 lb 14.4 oz)               We Performed the Following     [facility] PROGRESS NOTES REPORT     MANUAL THER TECH,1+REGIONS,EA 15 MIN     THERAPEUTIC EXERCISES        Primary Care Provider Office Phone # Fax #    Vani Yang [phone] [address] [address]        Equal Access to Services     [facility] : Hadii aad ku hadasho Soomaali, waaxda luqadaha, qaybta kaalmada adeegyada, waxay idiin hayaan adeeg khguzmansh lacaroln . So [facility] [phone].    ATENCIÓN: Si habla español, tiene a winter disposición servicios gratuitos de asistencia lingüística. Nicoleame al [phone].    We comply with applicable federal civil rights laws and [place] laws. We do not discriminate on the basis of race, color, national origin, age, disability, sex, sexual orientation, or gender identity.            Thank you!     Thank you for choosing [facility] PHYSICAL THERAPY  for your care. Our goal is always to provide you with excellent care. Hearing back from our patients is one way we can continue to improve our services. Please take a few minutes to complete the written survey that you may receive in the mail after your visit with us. Thank you!             Your Updated Medication List - Protect others around you: Learn how to safely use, store and throw away your medicines at www.disposemymeds.org.          This list is accurate as of 2/21/18  2:01 PM.  Always use your most recent med list.                   Brand Name Dispense Instructions for use Diagnosis    ADVIL PO      Take 200 mg by mouth every 6 hours as needed for moderate pain        albuterol 108 (90 BASE) MCG/ACT Inhaler    PROAIR HFA/PROVENTIL HFA/VENTOLIN HFA    1 Inhaler    Inhale 2 puffs into the lungs every 6 hours as needed for shortness of breath / dyspnea or wheezing    Cough, Acute bronchitis, unspecified organism       ALLEGRA PO      Take by mouth as needed for allergies        clindamycin 1 % solution    CLEOCIN T     Reported on  3/9/2017    Throat pain       EXCEDRIN PO      PRN        hydrOXYzine 25 MG tablet    ATARAX    30 tablet    Take 1-2 tablets (25-50 mg) by mouth nightly as needed for itching    Nonintractable headache, unspecified chronicity pattern, unspecified headache type       ipratropium - albuterol 0.5 mg/2.5 mg/3 mL 0.5-2.5 (3) MG/3ML neb solution    DUONEB    1 vial    Take 1 vial (3 mLs) by nebulization once for 1 dose    Cough

## 2018-03-20 ENCOUNTER — TRANSFERRED RECORDS (OUTPATIENT)
Dept: HEALTH INFORMATION MANAGEMENT | Facility: CLINIC | Age: 48
End: 2018-03-20

## 2018-04-26 DIAGNOSIS — R51.9 NONINTRACTABLE HEADACHE, UNSPECIFIED CHRONICITY PATTERN, UNSPECIFIED HEADACHE TYPE: ICD-10-CM

## 2018-04-26 RX ORDER — HYDROXYZINE HYDROCHLORIDE 25 MG/1
25 TABLET, FILM COATED ORAL
Qty: 30 TABLET | Refills: 1 | Status: SHIPPED | OUTPATIENT
Start: 2018-04-26 | End: 2018-11-27

## 2018-04-26 NOTE — TELEPHONE ENCOUNTER
Reason for Call:  Medication or medication refill:    Do you use a [facility]?  Name of the pharmacy and phone number for the current request:  [facility] - [phone]    Name of the medication requested: hydrOXYzine (ATARAX) 25 MG tablet    Other request: n/a    Can we leave a detailed message on this number? YES    Phone number patient can be reached at: Home number on file [phone] (home) or Cell number on file:    Telephone Information:   Mobile [phone]       Best Time: anytime    Call taken on 4/26/2018 at 9:57 AM by Virginia Angulo

## 2018-08-14 ENCOUNTER — TRANSFERRED RECORDS (OUTPATIENT)
Dept: HEALTH INFORMATION MANAGEMENT | Facility: CLINIC | Age: 48
End: 2018-08-14

## 2018-08-15 ENCOUNTER — TRANSFERRED RECORDS (OUTPATIENT)
Dept: HEALTH INFORMATION MANAGEMENT | Facility: CLINIC | Age: 48
End: 2018-08-15

## 2018-11-16 ENCOUNTER — TELEPHONE (OUTPATIENT)
Dept: FAMILY MEDICINE | Facility: CLINIC | Age: 48
End: 2018-11-16

## 2018-11-16 NOTE — TELEPHONE ENCOUNTER
Summary:    Patient is due/failing the following:   PAP    Action needed:   Patient needs office visit for PAP.    Type of outreach:    Phone, left message for patient to call back.     Questions for provider review:    None                                                                                                                                    Jesenia Sanz     Chart routed to Care Team .        Panel Management Review      Patient has the following on her problem list: None      Composite cancer screening  Chart review shows that this patient is due/due soon for the following Pap Smear

## 2018-11-16 NOTE — LETTER
[facility]  [address], [address]  [place] [address]  Phone: [phone]  Fax: [phone]  December 4, 2018      Neelima Gardner  [address] [place] [address]      Dear Neelima,    We care about your health and have reviewed your health plan including your medical conditions, medications, and lab results.  Based on this review, it is recommended that you follow up regarding the following health topic(s):  -Cervical Cancer Screening    We recommend you take the following action(s):  -schedule a PAP SMEAR EXAM which is due.  Please disregard this reminder if you have had this exam elsewhere within the last year.  It would be helpful for us to have a copy of your recent pap smear report to update your records.     Please call us at the [facility] - [phone] (or use Plair) to address the above recommendations.     Thank you for trusting [facility] and we appreciate the opportunity to serve you.  We look forward to supporting your healthcare needs in the future.    Healthy Regards,    Your Health Care Team  [facility]

## 2018-11-19 ENCOUNTER — RADIANT APPOINTMENT (OUTPATIENT)
Dept: MAMMOGRAPHY | Facility: CLINIC | Age: 48
End: 2018-11-19
Attending: OBSTETRICS & GYNECOLOGY
Payer: COMMERCIAL

## 2018-11-19 DIAGNOSIS — Z12.31 SCREENING MAMMOGRAM, ENCOUNTER FOR: ICD-10-CM

## 2018-11-19 PROCEDURE — 77063 BREAST TOMOSYNTHESIS BI: CPT | Performed by: STUDENT IN AN ORGANIZED HEALTH CARE EDUCATION/TRAINING PROGRAM

## 2018-11-19 PROCEDURE — 77067 SCR MAMMO BI INCL CAD: CPT | Performed by: STUDENT IN AN ORGANIZED HEALTH CARE EDUCATION/TRAINING PROGRAM

## 2018-11-27 ENCOUNTER — OFFICE VISIT (OUTPATIENT)
Dept: FAMILY MEDICINE | Facility: CLINIC | Age: 48
End: 2018-11-27
Payer: COMMERCIAL

## 2018-11-27 VITALS
WEIGHT: 175 LBS | TEMPERATURE: 97.7 F | HEIGHT: 65 IN | SYSTOLIC BLOOD PRESSURE: 108 MMHG | DIASTOLIC BLOOD PRESSURE: 76 MMHG | HEART RATE: 84 BPM | OXYGEN SATURATION: 99 % | RESPIRATION RATE: 18 BRPM | BODY MASS INDEX: 29.16 KG/M2

## 2018-11-27 DIAGNOSIS — J06.9 VIRAL UPPER RESPIRATORY TRACT INFECTION WITH COUGH: Primary | ICD-10-CM

## 2018-11-27 DIAGNOSIS — S90.31XA CONTUSION OF RIGHT FOOT, INITIAL ENCOUNTER: ICD-10-CM

## 2018-11-27 DIAGNOSIS — N39.3 FEMALE STRESS INCONTINENCE: ICD-10-CM

## 2018-11-27 PROCEDURE — 99214 OFFICE O/P EST MOD 30 MIN: CPT | Performed by: PHYSICIAN ASSISTANT

## 2018-11-27 RX ORDER — HYDROXYZINE HYDROCHLORIDE 25 MG/1
25 TABLET, FILM COATED ORAL
Qty: 30 TABLET | Refills: 1 | Status: SHIPPED | OUTPATIENT
Start: 2018-11-27 | End: 2020-01-16

## 2018-11-27 RX ORDER — BENZONATATE 200 MG/1
200 CAPSULE ORAL 3 TIMES DAILY PRN
Qty: 21 CAPSULE | Refills: 0 | Status: SHIPPED | OUTPATIENT
Start: 2018-11-27 | End: 2020-01-16

## 2018-11-27 ASSESSMENT — PAIN SCALES - GENERAL: PAINLEVEL: NO PAIN (0)

## 2018-11-27 NOTE — MR AVS SNAPSHOT
After Visit Summary   11/27/2018    Neelima Gardner    MRN: [id number]           Patient Information     Date Of Birth          1970        Visit Information        Provider Department      11/27/2018 11:00 AM Jose Lezama PA-C [facility] [place]        Today's Diagnoses     Viral upper respiratory tract infection with cough    -  1    Female stress incontinence        Nonintractable headache, unspecified chronicity pattern, unspecified headache type           Follow-ups after your visit        Additional Services     PHYSICAL THERAPY REFERRAL       If you have not heard from the scheduling office within 2 business days, please call [phone] for all locations, with the exception of [place], please call [phone] and [place], please call [phone].    Please be aware that coverage of these services is subject to the terms and limitations of your health insurance plan.  Call member services at your health plan with any benefit or coverage questions.                  Future tests that were ordered for you today     Open Future Orders        Priority Expected Expires Ordered    PHYSICAL THERAPY REFERRAL Routine  11/27/2019 11/27/2018            Who to contact     If you have questions or need follow up information about today's clinic visit or your schedule please contact [facility] directly at [phone].  Normal or non-critical lab and imaging results will be communicated to you by MyChart, letter or phone within 4 business days after the clinic has received the results. If you do not hear from us within 7 days, please contact the clinic through MyChart or phone. If you have a critical or abnormal lab result, we will notify you by phone as soon as possible.  Submit refill requests through [employer] or call your pharmacy and they will forward the refill request to us. Please allow 3 business days for your refill to be completed.          Additional Information  "About Your Visit        Testinhart Information     [employer] gives you secure access to your electronic health record. If you see a primary care provider, you can also send messages to your care team and make appointments. If you have questions, please call your primary care clinic.  If you do not have a primary care provider, please call [phone] and they will assist you.        Care EveryWhere ID     This is your Care EveryWhere ID. This could be used by other organizations to access your [place] medical records  [id number]        Your Vitals Were     Pulse Temperature Respirations Height Pulse Oximetry BMI (Body Mass Index)    84 97.7  F (36.5  C) (Temporal) 18 5' 4.5\" (1.638 m) 99% 29.57 kg/m2       Blood Pressure from Last 3 Encounters:   11/27/18 108/76   02/15/18 130/76   12/13/17 130/83    Weight from Last 3 Encounters:   11/27/18 175 lb (79.4 kg)   02/15/18 181 lb 11.2 oz (82.4 kg)   12/13/17 185 lb 9.6 oz (84.2 kg)                 Today's Medication Changes          These changes are accurate as of 11/27/18 11:25 AM.  If you have any questions, ask your nurse or doctor.               Start taking these medicines.        Dose/Directions    benzonatate 200 MG capsule   Commonly known as:  TESSALON   Used for:  Viral upper respiratory tract infection with cough   Started by:  Jose Lezama PA-C        Dose:  200 mg   Take 1 capsule (200 mg) by mouth 3 times daily as needed for cough   Quantity:  21 capsule   Refills:  0            Where to get your medicines      These medications were sent to [id number] [place] - [address]  [address], [place] [address]    Hours:  test fax sent successfully 1/20/04   Phone:  [phone]     benzonatate 200 MG capsule    hydrOXYzine 25 MG tablet                Primary Care Provider Office Phone # Fax #    Vani Yang [phone] [address]  [place] [address]   "      Equal Access to Services     [facility] : Hadii brittany alvares davina Godinez, waaxda luqadaha, qaybta kaalmada mickeytiarragee, marshall barbosa. So [facility] [phone].    ATENCIÓN: Si habla español, tiene a winter disposición servicios gratuitos de asistencia lingüística. Llame al [phone].    We comply with applicable federal civil rights laws and [place] laws. We do not discriminate on the basis of race, color, national origin, age, disability, sex, sexual orientation, or gender identity.            Thank you!     Thank you for choosing [facility]  for your care. Our goal is always to provide you with excellent care. Hearing back from our patients is one way we can continue to improve our services. Please take a few minutes to complete the written survey that you may receive in the mail after your visit with us. Thank you!             Your Updated Medication List - Protect others around you: Learn how to safely use, store and throw away your medicines at www.disposemymeds.org.          This list is accurate as of 11/27/18 11:25 AM.  Always use your most recent med list.                   Brand Name Dispense Instructions for use Diagnosis    ADVIL PO      Take 200 mg by mouth every 6 hours as needed for moderate pain        albuterol 108 (90 Base) MCG/ACT inhaler    PROAIR HFA/PROVENTIL HFA/VENTOLIN HFA    1 Inhaler    Inhale 2 puffs into the lungs every 6 hours as needed for shortness of breath / dyspnea or wheezing    Cough, Acute bronchitis, unspecified organism       ALLEGRA PO      Take by mouth as needed for allergies        benzonatate 200 MG capsule    TESSALON    21 capsule    Take 1 capsule (200 mg) by mouth 3 times daily as needed for cough    Viral upper respiratory tract infection with cough       clindamycin 1 % solution    CLEOCIN T     Reported on 3/9/2017    Throat pain       EXCEDRIN PO      PRN        hydrOXYzine 25 MG tablet    ATARAX    30 tablet    Take 1  tablet (25 mg) by mouth nightly as needed for other (sleep)    Nonintractable headache, unspecified chronicity pattern, unspecified headache type       ipratropium - albuterol 0.5 mg/2.5 mg/3 mL 0.5-2.5 (3) MG/3ML neb solution    DUONEB    1 vial    Take 1 vial (3 mLs) by nebulization once for 1 dose    Cough
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
## 2019-05-06 ENCOUNTER — TELEPHONE (OUTPATIENT)
Dept: FAMILY MEDICINE | Facility: CLINIC | Age: 49
End: 2019-05-06

## 2019-05-06 NOTE — TELEPHONE ENCOUNTER
Summary:    Patient is due/failing the following:   PAP and PHYSICAL    Action needed:   Patient needs office visit for PAP and Physical .    Type of outreach:    Phone, left message for patient to call back.     Questions for provider review:    None                                                                                                                                    Jesenia Sanz     Chart routed to Care Team .        Panel Management Review      Patient has the following on her problem list: None      Composite cancer screening  Chart review shows that this patient is due/due soon for the following Pap Smear

## 2020-01-06 NOTE — TELEPHONE ENCOUNTER
"Routing refill request to provider for review/approval because:  A break in medication - last prescribed 11/27/2018  Patient needs to be seen because it has been more than 1 year since last office visit.    Requested Prescriptions   Pending Prescriptions Disp Refills     hydrOXYzine (ATARAX) 25 MG tablet 30 tablet 1     Sig: Take 1 tablet (25 mg) by mouth nightly as needed for other (sleep)       Antihistamines Protocol Failed - 1/6/2020 12:31 PM        Failed - Recent (12 mo) or future (30 days) visit within the authorizing provider's specialty     Patient has had an office visit with the authorizing provider or a provider within the authorizing providers department within the previous 12 mos or has a future within next 30 days. See \"Patient Info\" tab in inbasket, or \"Choose Columns\" in Meds & Orders section of the refill encounter.              Passed - Patient is age 3 or older     Apply age and/or weight-based dosing for peds patients age 3 and older.    Forward request to provider for patients under the age of 3.          Passed - Medication is active on med list        Nirali Madden RN  "

## 2020-01-06 NOTE — TELEPHONE ENCOUNTER
Reason for call:  Medication     If this is a refill request, has the caller requested the refill from the pharmacy already? Yes     Will the patient be using a [facility]? No     Name of the pharmacy and phone number for the current request: [facility] [phone]    Name of the medication requested: hydrOXYzine (ATARAX) 25 MG tablet    Other request: na    Phone number to reach patient:  Home number on file [phone] (home)    Best Time:  any    Can we leave a detailed message on this number?  YES

## 2020-01-07 RX ORDER — HYDROXYZINE HYDROCHLORIDE 25 MG/1
25 TABLET, FILM COATED ORAL
Qty: 30 TABLET | Refills: 1 | OUTPATIENT
Start: 2020-01-07

## 2020-01-16 ENCOUNTER — OFFICE VISIT (OUTPATIENT)
Dept: FAMILY MEDICINE | Facility: CLINIC | Age: 50
End: 2020-01-16
Payer: COMMERCIAL

## 2020-01-16 VITALS
BODY MASS INDEX: 31.51 KG/M2 | SYSTOLIC BLOOD PRESSURE: 100 MMHG | OXYGEN SATURATION: 99 % | HEART RATE: 88 BPM | WEIGHT: 184.6 LBS | HEIGHT: 64 IN | TEMPERATURE: 98.7 F | RESPIRATION RATE: 20 BRPM | DIASTOLIC BLOOD PRESSURE: 70 MMHG

## 2020-01-16 DIAGNOSIS — S90.852A FOREIGN BODY IN LEFT FOOT, INITIAL ENCOUNTER: Primary | ICD-10-CM

## 2020-01-16 DIAGNOSIS — G47.00 INSOMNIA, UNSPECIFIED TYPE: ICD-10-CM

## 2020-01-16 PROCEDURE — 28190 REMOVAL OF FOOT FOREIGN BODY: CPT | Performed by: FAMILY MEDICINE

## 2020-01-16 PROCEDURE — 99213 OFFICE O/P EST LOW 20 MIN: CPT | Mod: 25 | Performed by: FAMILY MEDICINE

## 2020-01-16 RX ORDER — HYDROXYZINE HYDROCHLORIDE 50 MG/1
50 TABLET, FILM COATED ORAL
Qty: 30 TABLET | Refills: 0 | Status: SHIPPED | OUTPATIENT
Start: 2020-01-16 | End: 2020-11-23

## 2020-01-16 ASSESSMENT — PAIN SCALES - GENERAL: PAINLEVEL: MILD PAIN (2)

## 2020-01-16 ASSESSMENT — MIFFLIN-ST. JEOR: SCORE: 1451.31

## 2020-01-16 NOTE — PATIENT INSTRUCTIONS
Patient Education     Foreign Object Under the Skin (Removed)  An object has been removed from under your skin. Although care was taken to remove all of it, there is always a chance that a small piece may have been left behind.  Most skin wounds heal without problems. But there can be an increased risk of infection if anything stays under the skin. Sometimes the pieces work their way out on their own, and sometimes they can cause an infection. Very small pieces that stay under the skin usually don t cause a problem or need further treatment.  Home care  Wound care    Keep the wound clean and dry.    If there is a dressing or bandage, change it when it gets wet or dirty. Otherwise, leave it on for the first 24 hours, then change it once a day or as often as you were instructed.    If stitches or staples were used, clean the wound every day:  ? After taking off the dressing, wash the area gently with soap and water.  ? Apply a thin layer of antibiotic ointment to the cut. This will keep the wound clean and make it easier to remove the stitches. If it is oozing a lot, you can put a nonstick dressing over it. Then reapply the bandage or dressing as you were instructed.  ? You can get it wet, just like when you clean it. This means you can shower as usual for the first 24 hours, but don't soak the area in water (no baths or swimming) until the stitches or staples are taken out.    If surgical tape or strips were used, keep the area clean and dry. If it becomes wet, blot it dry with a towel.    Medicine    You can take over-the-counter medicine for pain, unless you were given a different pain medicine to use. If you have chronic liver or kidney disease or ever had a stomach ulcer, or gastrointestinal bleeding or are taking blood thinner medicines, talk with your healthcare provider before using these medicines.    If you were given antibiotics, take them until they are used up. It's important to finish the antibiotics  even if the wound looks better to make sure the infection clears.    Follow-up care  Follow up your healthcare provider, or as advised. Keep in mind the following:    Watch for any signs of infection, such as increasing pain, redness, swelling, or pus drainage. If this happens, don t wait for your scheduled visit, rather see your healthcare provider sooner.    Stitches or staples are usually taken out within 5 to 14 days. This varies depending on what part of your body they are on, and the type of wound. The healthcare provider will tell you how long they should be left in.    If surgical tape or strips were used, they are usually left on for 7 to 10 days. You can remove them after that unless you were told otherwise. If you try to remove them, and it is too difficult, soaking can help. If the edges of the cut pull apart, then stop removing the tape, and follow up with your healthcare provider.    If X-rays were taken, you will be told if there are new findings that may affect your care.  When to seek medical care  Call your healthcare provider right away if any of these occur:    Fever of 100.4 F (38 C) or higher, or as directed by your healthcare provider    Increasing pain in the wound    Redness, swelling or pus coming from the wound  Date Last Reviewed: 10/1/2016    0699-8358 [employer]. [address] [place] [address], [place] [address]. All rights reserved. This information is not intended as a substitute for professional medical care. Always follow your healthcare professional's instructions.

## 2020-01-16 NOTE — PROGRESS NOTES
Subjective     Neelima Gardner is a 49 year old female who presents to clinic today for the following health issues:    History of Present Illness    She consumes 0 sweetened beverage(s) daily.She exercises with enough effort to increase her heart rate 20 to 29 minutes per day.  She exercises with enough effort to increase her heart rate 4 days per week.   She is taking medications regularly.     Patient is here to renew her prescription of hydroxyzine as a sleep aid.  She has tried over-the-counter melatonin and ZzzQuil without benefit.  She tries to limit activating activities prior to bed.  She has no side effects with the medication.  Currently only has 2 left.  Typically takes 50 mg dose intermittently only a few times per month.    Additionally she was remodeling her condo on Tuesday of this week.  She believes she stepped on a glass tile and now has a shard lodged in her left heel.  It is painful to walk.  She has not noticed any drainage or purulence.  She has been trying to remove it with her boyfriend's help and has been unsuccessful.  He denies any fever, chills, lymphadenopathy, or other symptoms.  She is up-to-date on her tetanus shot.    Patient Active Problem List   Diagnosis     CARDIOVASCULAR SCREENING; LDL GOAL LESS THAN 160     Common wart     Past Surgical History:   Procedure Laterality Date     C/SECTION, LOW TRANSVERSE      , Low Transverse       Social History     Tobacco Use     Smoking status: Former Smoker     Packs/day: 1.00     Types: Cigarettes     Last attempt to quit: 10/10/2019     Years since quittin.2     Smokeless tobacco: Never Used   Substance Use Topics     Alcohol use: Yes     Comment: Occ     Family History   Problem Relation Age of Onset     Osteoporosis Mother      Diabetes Father      Colon Cancer Paternal Aunt          Current Outpatient Medications   Medication Sig Dispense Refill     EXCEDRIN OR PRN       Fexofenadine HCl (ALLEGRA PO) Take by mouth as needed  "for allergies       hydrOXYzine (ATARAX) 50 MG tablet Take 1 tablet (50 mg) by mouth nightly as needed for other (sleep) 30 tablet 0     clindamycin (CLEOCIN T) 1 % solution Reported on 3/9/2017  3     Ibuprofen (ADVIL PO) Take 200 mg by mouth every 6 hours as needed for moderate pain       Allergies   Allergen Reactions     Codeine      Sulfa Drugs      Reviewed and updated as needed this visit by Provider  Tobacco  Allergies  Meds  Problems  Med Hx  Surg Hx  Fam Hx       Review of Systems   ROS COMP: Constitutional, Derm, lymph, MSK, neuro, cardiovascular, pulmonary systems are negative, except as otherwise noted.      Objective    /70   Pulse 88   Temp 98.7  F (37.1  C) (Temporal)   Resp 20   Ht 1.632 m (5' 4.25\")   Wt 83.7 kg (184 lb 9.6 oz)   SpO2 99%   BMI 31.44 kg/m    Body mass index is 31.44 kg/m .  Physical Exam   General: Appears well and in no acute distress.  Cardiovascular: Regular rate and rhythm, normal S1 and S2 without murmur. No extra heartsounds or friction rub. Radial pulses present and equal bilaterally.  Respiratory: Lungs clear to auscultation bilaterally. No wheezing or crackles. No prolonged expiration. Symmetrical chest rise.  Musculoskeletal: No gross extremity deformities. No peripheral edema. Normal muscle bulk.   Derm: Small shard of glass located on the inferior aspect of the left heel approximately 2 mm in size.  No surrounding erythema or purulence.  No suspicious lesions or rashes over exposed surfaces.    Diagnostic Test Results:  none         Assessment & Plan   1. Foreign body in left foot, initial encounter: Verbal consent obtained to remove foreign body from left foot.  A 15 blade was used to pare down surrounding skin.  An 11 blade and forceps with teeth were used to remove the piece of glass.  The remaining area of the wound was examined for further glass.  None was found.  No bleeding.  The area was covered with bacitracin and a Band-Aid.  Discussed " wound care and return precautions.  Up-to-date on Tdap.  - REMOVAL FOREIGN BODY FOOT, SUBCUTANEOUS    2. Insomnia, unspecified type: Refilled medication.  Sent to verified pharmacy.  Recommended intermittent use and sleep hygiene, specifically avoiding screen time prior to bed.  - hydrOXYzine (ATARAX) 50 MG tablet; Take 1 tablet (50 mg) by mouth nightly as needed for other (sleep)  Dispense: 30 tablet; Refill: 0     Return in about 1 week (around 1/23/2020), or if symptoms worsen or fail to improve.    Bereket Virgen MD  [facility]     This chart is completed utilizing dictation software; typos and/or incorrect word substitutions may unintentionally occur.

## 2020-02-21 ENCOUNTER — TELEPHONE (OUTPATIENT)
Dept: FAMILY MEDICINE | Facility: CLINIC | Age: 50
End: 2020-02-21

## 2020-02-21 NOTE — LETTER
[facility]  [address]  [place] [address]  Phone: [phone]  Fax: [phone]  February 27, 2020      Neelima Buckley  [address]  [place] [address]      Dear Neelima,    We care about your health and have reviewed your health plan including your medical conditions, medications, and lab results.  Based on this review, it is recommended that you follow up regarding the following health topic(s):  -Cervical Cancer Screening  -Wellness (Physical) Visit     We recommend you take the following action(s):  -schedule a PAP SMEAR EXAM which is due.  Please disregard this reminder if you have had this exam elsewhere within the last year.  It would be helpful for us to have a copy of your recent pap smear report to update your records.     Please call us at the [facility] - [phone] (or use MY[facility]) to address the above recommendations.     Thank you for trusting [facility] and we appreciate the opportunity to serve you.  We look forward to supporting your healthcare needs in the future.    Healthy Regards,    Your Health Care Team  [facility]

## 2020-02-21 NOTE — TELEPHONE ENCOUNTER
Summary:    Patient is due/failing the following:   PAP and PHYSICAL    Action needed:   Patient needs office visit for Physical and PAP.    Type of outreach:    Phone, left message for patient to call back.     Questions for provider review:    None                                                                                                                                    Jesenia Cam CMA       Chart routed to Care Team .

## 2020-03-01 ENCOUNTER — HEALTH MAINTENANCE LETTER (OUTPATIENT)
Age: 50
End: 2020-03-01

## 2020-03-06 ENCOUNTER — TELEPHONE (OUTPATIENT)
Dept: FAMILY MEDICINE | Facility: CLINIC | Age: 50
End: 2020-03-06

## 2020-03-06 NOTE — TELEPHONE ENCOUNTER
Please abstract the following data from this visit with this patient into the appropriate field in Epic:    Tests that can be patient reported without a hard copy:        Other Tests found in the patient's chart through Chart Review/Care Everywhere:    HPV done by this group OGI on this date: 03/09/2016 and was negative see media tab dated 02/20/2020    Note to [employer]: If this section is blank, no results were found via Chart Review/Care Everywhere.        Summary:    Patient is due/failing the following:   HPV cotest    Action needed:   None    Type of outreach:    none    Questions for provider review:    None                                                                                                                                    Jesenia Cam CMA       Chart routed to Care Team .

## 2020-11-20 DIAGNOSIS — G47.00 INSOMNIA, UNSPECIFIED TYPE: ICD-10-CM

## 2020-11-22 NOTE — TELEPHONE ENCOUNTER
Pending Prescriptions:                       Disp   Refills    hydrOXYzine (ATARAX) 50 MG tablet          30 tab*0        Sig: Take 1 tablet (50 mg) by mouth nightly as needed for           other (sleep)      Routing refill request to provider for review/approval because:  A break in medication    Minerva Brooks RN

## 2020-11-23 RX ORDER — HYDROXYZINE HYDROCHLORIDE 50 MG/1
50 TABLET, FILM COATED ORAL
Qty: 30 TABLET | Refills: 0 | Status: SHIPPED | OUTPATIENT
Start: 2020-11-23 | End: 2021-01-19

## 2020-12-13 ENCOUNTER — HEALTH MAINTENANCE LETTER (OUTPATIENT)
Age: 50
End: 2020-12-13

## 2021-01-19 ENCOUNTER — OFFICE VISIT (OUTPATIENT)
Dept: FAMILY MEDICINE | Facility: CLINIC | Age: 51
End: 2021-01-19
Payer: COMMERCIAL

## 2021-01-19 VITALS
RESPIRATION RATE: 14 BRPM | TEMPERATURE: 98 F | BODY MASS INDEX: 31.76 KG/M2 | SYSTOLIC BLOOD PRESSURE: 132 MMHG | DIASTOLIC BLOOD PRESSURE: 76 MMHG | WEIGHT: 186.5 LBS | HEART RATE: 74 BPM

## 2021-01-19 DIAGNOSIS — J30.2 SEASONAL ALLERGIC RHINITIS, UNSPECIFIED TRIGGER: Primary | ICD-10-CM

## 2021-01-19 DIAGNOSIS — L57.8 NODULAR ELASTOSIS WITH CYSTS AND COMEDONES OF FAVRE AND RACOUCHOT: Primary | ICD-10-CM

## 2021-01-19 DIAGNOSIS — L70.0 NODULAR ELASTOSIS WITH CYSTS AND COMEDONES OF FAVRE AND RACOUCHOT: Primary | ICD-10-CM

## 2021-01-19 DIAGNOSIS — G47.00 INSOMNIA, UNSPECIFIED TYPE: ICD-10-CM

## 2021-01-19 LAB
BUN SERPL-MCNC: 12 MG/DL (ref 7–30)
CREAT SERPL-MCNC: 0.88 MG/DL (ref 0.52–1.04)
GFR SERPL CREATININE-BSD FRML MDRD: 76 ML/MIN/{1.73_M2}
POTASSIUM SERPL-SCNC: 3.8 MMOL/L (ref 3.4–5.3)

## 2021-01-19 PROCEDURE — 84132 ASSAY OF SERUM POTASSIUM: CPT | Performed by: DERMATOLOGY

## 2021-01-19 PROCEDURE — 36415 COLL VENOUS BLD VENIPUNCTURE: CPT | Performed by: DERMATOLOGY

## 2021-01-19 PROCEDURE — 84520 ASSAY OF UREA NITROGEN: CPT | Performed by: DERMATOLOGY

## 2021-01-19 PROCEDURE — 82565 ASSAY OF CREATININE: CPT | Performed by: DERMATOLOGY

## 2021-01-19 PROCEDURE — 99213 OFFICE O/P EST LOW 20 MIN: CPT | Performed by: FAMILY MEDICINE

## 2021-01-19 RX ORDER — HYDROXYZINE HYDROCHLORIDE 50 MG/1
50 TABLET, FILM COATED ORAL
Qty: 30 TABLET | Refills: 3 | Status: SHIPPED | OUTPATIENT
Start: 2021-01-19 | End: 2024-04-05

## 2021-01-19 RX ORDER — CETIRIZINE HYDROCHLORIDE 10 MG/1
10 TABLET ORAL DAILY
Qty: 90 TABLET | Refills: 3 | Status: SHIPPED | OUTPATIENT
Start: 2021-01-19 | End: 2022-04-06

## 2021-01-19 RX ORDER — SPIRONOLACTONE 50 MG/1
100 TABLET, FILM COATED ORAL DAILY
COMMUNITY
Start: 2020-11-19

## 2021-01-19 RX ORDER — CETIRIZINE HYDROCHLORIDE 10 MG/1
10 TABLET ORAL DAILY
COMMUNITY
End: 2021-01-19

## 2021-01-19 NOTE — PATIENT INSTRUCTIONS
Patient Education     Allergic Rhinitis  Allergic rhinitis is an allergic reaction that affects the nose, and often the eyes. It s often known as nasal allergies. Nasal allergies are often due to things in the environment that are breathed in. Depending what you are sensitive to, nasal allergies may occur only during certain seasons, or they may occur year round. Common indoor allergens include house dust mites, mold, cockroaches, and pet dander. Outdoor allergens include pollen from trees, grasses, and weeds.    Symptoms include a drippy, stuffy, and itchy nose. They also include sneezing and red and itchy eyes. You may feel tired more often. Severe allergies may also affect your breathing and trigger a condition called asthma.    Tests can be done to see what allergens are affecting you. You may be referred to an allergy specialist for testing and further evaluation.   Home care  Your healthcare provider may prescribe medicines to help relieve allergy symptoms. These may include oral medicines, nasal sprays, or eye drops.   Ask your provider for advice on how to stay away from substances that you are allergic to. Below are a few tips for each type of allergen.   Pet dander:    Do not have pets with fur and feathers.    If you have a pet, keep it out of your bedroom and off upholstered furniture.  Pollen:    When pollen counts are high, keep windows of your car and home closed. If possible, use an air conditioner instead.    Wear a filter mask when mowing or doing yard work.  House dust mites:    Wash bedding every week in warm water and detergent and dry on a hot setting.    Cover the mattress, box spring, and pillows with allergy covers.     If possible, sleep in a room with no carpet, curtains, or upholstered furniture.  Cockroaches:    Store food in sealed containers.    Remove garbage from the home promptly.    Fix water leaks.  Mold:    Keep humidity low by using a dehumidifier or air conditioner. Keep the  dehumidifier and air conditioner clean and free of mold.    Clean moldy areas with bleach and water. Don't mix bleach with other .  In general:    Vacuum once or twice a week. If possible, use a vacuum with a high-efficiency particulate air (HEPA) filter.    Don't smoke. Stay away from cigarette smoke. Cigarette smoke is an irritant that can make symptoms worse.  Follow-up care  Follow up as advised by the healthcare provider or our staff. If you were referred to an allergy specialist, make this appointment promptly.   When to seek medical advice  Call your healthcare provider or get medical care right away if the following occur:     Coughing    Fever of 100.4 F (38 C) or higher, or as directed by your healthcare provider    Raised red bumps (hives)    Continuing symptoms, new symptoms, or worsening symptoms  Call 911  Call 911 if you have:     Trouble breathing    Severe swelling of the face or severe itching of the eyes or mouth    Wheezing or shortness of breath    Chest tightness    Dizziness or lightheadedness    Feeling of doom    Stomach pain, bloating, vomiting, or diarrhea  [email or web address] last reviewed this educational content on 10/1/2019    5233-0144 [employer]. [address] [place] [address], [place] [address]. All rights reserved. This information is not intended as a substitute for professional medical care. Always follow your healthcare professional's instructions.

## 2021-01-19 NOTE — PROGRESS NOTES
Assessment & Plan     Seasonal allergic rhinitis, unspecified trigger  50-year-old female with history of allergic rhinitis, recently started cetirizine with good effect.  She is requesting prescription, understands this is over-the-counter.  Follow-up no improvement or any worsening.  - cetirizine (ZYRTEC) 10 MG tablet; Take 1 tablet (10 mg) by mouth daily    Insomnia, unspecified type  Well-controlled with as needed hydroxyzine.  Medication refilled.  - hydrOXYzine (ATARAX) 50 MG tablet; Take 1 tablet (50 mg) by mouth nightly as needed for other (sleep)      20 minutes spent on the date of the encounter doing chart review, patient visit and documentation          Return in about 4 weeks (around 2/16/2021) for Annual Well Check.    Dirk Otoole MD  [facility] PETERSON Ortez is a 50 year old who presents to clinic today for the following health issues     HPI       Allergies  Onset/Duration: Since she was in her 30s  Symptoms:   Nasal congestion: YES  Sneezing: YES  Red, itchy eyes: YES  Progression of Symptoms: same constant  Accompanying Signs & Symptoms:  Cough: YES  Wheezing: no  Rash: no  Sinus/facial pain: no  History:   Is it seasonal: in the fall and during any time of the year   History of Asthma: no  Has allergy testing been done: no  Precipitating factors:   None  Alleviating factors:  None  Therapies tried and outcome: she takes allergy pills year around she will change then up when they stop working. She would like to get a rx for these so she doesn't have to spend as much money for the medication OTC.    She also needs lab work done for her dermatologist.         Review of Systems   Constitutional, HEENT, cardiovascular, pulmonary, gi and gu systems are negative, except as otherwise noted.      Objective    /76   Pulse 74   Temp 98  F (36.7  C) (Temporal)   Resp 14   Wt 84.6 kg (186 lb 8 oz)   BMI 31.76 kg/m    Body mass index is 31.76 kg/m .  Physical Exam    GENERAL: healthy, alert and no distress  EYES: Eyes grossly normal to inspection, PERRL and conjunctivae and sclerae normal  HENT: ear canals and TM's normal, nose and mouth without ulcers or lesions  NECK: no adenopathy, no asymmetry, masses, or scars and thyroid normal to palpation  RESP: lungs clear to auscultation - no rales, rhonchi or wheezes  CV: regular rate and rhythm, normal S1 S2, no S3 or S4, no murmur, click or rub, no peripheral edema and peripheral pulses strong  MS: no gross musculoskeletal defects noted, no edema  NEURO: Normal strength and tone, mentation intact and speech normal  PSYCH: mentation appears normal, affect normal/bright

## 2021-02-11 ENCOUNTER — ANCILLARY PROCEDURE (OUTPATIENT)
Dept: MAMMOGRAPHY | Facility: CLINIC | Age: 51
End: 2021-02-11
Attending: OBSTETRICS & GYNECOLOGY
Payer: COMMERCIAL

## 2021-02-11 DIAGNOSIS — Z12.31 VISIT FOR SCREENING MAMMOGRAM: ICD-10-CM

## 2021-02-11 PROCEDURE — 77063 BREAST TOMOSYNTHESIS BI: CPT | Mod: GC | Performed by: RADIOLOGY

## 2021-02-11 PROCEDURE — 77067 SCR MAMMO BI INCL CAD: CPT | Mod: GC | Performed by: RADIOLOGY

## 2021-02-12 ENCOUNTER — TRANSCRIBE ORDERS (OUTPATIENT)
Dept: OTHER | Age: 51
End: 2021-02-12

## 2021-02-12 DIAGNOSIS — Z12.11 ENCOUNTER FOR SCREENING COLONOSCOPY: Primary | ICD-10-CM

## 2021-03-29 ENCOUNTER — TELEPHONE (OUTPATIENT)
Dept: FAMILY MEDICINE | Facility: CLINIC | Age: 51
End: 2021-03-29

## 2021-03-29 DIAGNOSIS — J30.2 SEASONAL ALLERGIC RHINITIS, UNSPECIFIED TRIGGER: Primary | ICD-10-CM

## 2021-03-29 RX ORDER — CETIRIZINE HYDROCHLORIDE, PSEUDOEPHEDRINE HYDROCHLORIDE 5; 120 MG/1; MG/1
1 TABLET, FILM COATED, EXTENDED RELEASE ORAL 2 TIMES DAILY
Qty: 60 TABLET | Refills: 0 | Status: SHIPPED | OUTPATIENT
Start: 2021-03-29 | End: 2022-04-06

## 2021-03-29 NOTE — TELEPHONE ENCOUNTER
Left detailed message for pt as requested.     Will postpone for SA upon his return to consider further refills

## 2021-03-29 NOTE — TELEPHONE ENCOUNTER
With Dr. Otoole out this week, did send a one month supply of zyrtec D to her pharmacy.     Can be reevaluated on his return.

## 2021-03-29 NOTE — TELEPHONE ENCOUNTER
Reason for Call:  Medication or medication refill:    Do you use a [facility]?  Name of the pharmacy and phone number for the current request: [facility] [place]  [facility]     Name of the medication requested: Pt was given Zyrtec 10 mg on 1/19/21 but thought it was going to be Zyrtec D. She has given it a good try but it isn't helping.     Would like a new rx for Zyrtec D sent to pharm above     Can we leave a detailed message on this number? YES    Phone number patient can be reached at: Cell number on file:    Telephone Information:   Mobile [phone]       Best Time: anytime     Call taken on 3/29/2021 at 11:43 AM by Natty Larios

## 2021-04-17 ENCOUNTER — HEALTH MAINTENANCE LETTER (OUTPATIENT)
Age: 51
End: 2021-04-17

## 2021-10-02 ENCOUNTER — HEALTH MAINTENANCE LETTER (OUTPATIENT)
Age: 51
End: 2021-10-02

## 2021-11-27 ENCOUNTER — HEALTH MAINTENANCE LETTER (OUTPATIENT)
Age: 51
End: 2021-11-27

## 2022-03-23 ENCOUNTER — TRANSCRIBE ORDERS (OUTPATIENT)
Dept: OTHER | Age: 52
End: 2022-03-23
Payer: COMMERCIAL

## 2022-03-23 DIAGNOSIS — Z12.11 SCREENING FOR COLON CANCER: Primary | ICD-10-CM

## 2022-04-06 ENCOUNTER — VIRTUAL VISIT (OUTPATIENT)
Dept: FAMILY MEDICINE | Facility: OTHER | Age: 52
End: 2022-04-06
Payer: COMMERCIAL

## 2022-04-06 DIAGNOSIS — R43.8 METALLIC TASTE: ICD-10-CM

## 2022-04-06 DIAGNOSIS — J30.1 SEASONAL ALLERGIC RHINITIS DUE TO POLLEN: ICD-10-CM

## 2022-04-06 DIAGNOSIS — J01.90 ACUTE SINUSITIS WITH SYMPTOMS > 10 DAYS: Primary | ICD-10-CM

## 2022-04-06 PROBLEM — F51.04 PSYCHOPHYSIOLOGICAL INSOMNIA: Status: ACTIVE | Noted: 2022-04-06

## 2022-04-06 PROCEDURE — 99214 OFFICE O/P EST MOD 30 MIN: CPT | Mod: 95 | Performed by: PHYSICIAN ASSISTANT

## 2022-04-06 RX ORDER — CETIRIZINE HYDROCHLORIDE, PSEUDOEPHEDRINE HYDROCHLORIDE 5; 120 MG/1; MG/1
1 TABLET, FILM COATED, EXTENDED RELEASE ORAL 2 TIMES DAILY
Qty: 180 TABLET | Refills: 3 | Status: SHIPPED | OUTPATIENT
Start: 2022-04-06

## 2022-04-06 RX ORDER — AZELASTINE 1 MG/ML
1-2 SPRAY, METERED NASAL 2 TIMES DAILY
Qty: 30 ML | Refills: 3 | Status: SHIPPED | OUTPATIENT
Start: 2022-04-06 | End: 2024-04-05

## 2022-04-06 NOTE — PATIENT INSTRUCTIONS
- Start antibiotic - Augmentin - 1 tablet twice a day for 10 days     - Start Azelastine spray - 1 to 2 sprays each side twice a day     - Continue Zyrtec    - Follow up in 2-4 weeks if not improving

## 2022-04-06 NOTE — PROGRESS NOTES
Neelima is a 51 year old who is being evaluated via a billable video visit.      How would you like to obtain your AVS? MyChart  If the video visit is dropped, the invitation should be resent by: Text to cell phone: [phone]  Will anyone else be joining your video visit? No    Video Start Time: 10:03 AM    Assessment & Plan     ICD-10-CM    1. Acute sinusitis with symptoms > 10 days  J01.90 amoxicillin-clavulanate (AUGMENTIN) 875-125 MG tablet   2. Seasonal allergic rhinitis due to pollen  J30.1 azelastine (ASTELIN) 0.1 % nasal spray     cetirizine-pseudoePHEDrine ER (ZYRTEC-D) 5-120 MG 12 hr tablet   3. Metallic taste  R43.8       - Patient with known allergies, takes Zyrtec-D daily, with 10 days of sinus symptoms and metal tasted in mouth for about 1 month   - Did have covid last fall   - Due to sinusitis symptoms, recommend antibiotic treatment   - Discussed antibiotic use, duration, and side effects  - Also recommend starting antihistamine nasal spray - will start Azelastine, discussed use and side effects, discussed will help with sinus infection and can continue to use for allergens   - Metallic taste - could be due to allergies and/or sinus infection     Discussed if persists after treatment then should have office visit for physical exam and possible labs       Review of the result(s) of each unique test - None   Diagnosis or treatment significantly limited by social determinants of health - None     22 minutes spent on the date of the encounter doing chart review, history and exam, documentation and further activities as noted above    Return in about 2 weeks (around 4/20/2022) for if not improving.    [facility]    Siddharth Ortez is a 51 year old who presents for the following health issues     HPI     Acute Illness  Acute illness concerns: cough, sinus pressure  Onset/Duration: x 10 days  Symptoms:  Fever: YES-low grade for a couple  days  Chills/Sweats: no  Headache (location?): no  Sinus Pressure: YES  Conjunctivitis:  no  Ear Pain: no  Rhinorrhea: YES  Congestion: YES  Sore Throat: YES- intermittent   Cough: YES-non-productive, productive of green sputum  Wheeze: no  Decreased Appetite: no  Nausea: no  Vomiting: YES - once  Diarrhea: no  Dysuria/Freq.: no  Dysuria or Hematuria: no  Fatigue/Achiness: no  Sick/Strep Exposure: no  Therapies tried and outcome: dayquil and nyquil    - Getting worse, not better   - Constant drainage   - Worried about sinus infection   - Goofy taste and smell  - Had COVID back in November, bad taste and smell since then   - Came back metal taste for about 1 month ago      Boyfriend said can smell it      Good dental hygiene, did have crown awhile ago   - Zyrtec D every single day year round   - Doesn't think lymph nodes swollen   - No sinus tenderness  - No pain with forward movement   - Never done nasal sprays for allergies       Review of Systems   Constitutional, HEENT, cardiovascular, pulmonary, gi and gu systems are negative, except as otherwise noted.      Objective     Vitals:  No vitals were obtained today due to virtual visit.    Physical Exam   GENERAL: Healthy, alert and no distress  EYES: Eyes grossly normal to inspection.  No discharge or erythema, or obvious scleral/conjunctival abnormalities.  RESP: No audible wheeze, cough, or visible cyanosis.  No visible retractions or increased work of breathing.    SKIN: Visible skin clear. No significant rash, abnormal pigmentation or lesions.  NEURO: Cranial nerves grossly intact.  Mentation and speech appropriate for age.  PSYCH: Speech is congested, Mentation appears normal, affect normal/bright, judgement and insight intact, normal speech and appearance well-groomed.    Diagnostics: none         Video-Visit Details    Type of service:  Video Visit    Video End Time:10:15 AM    Originating Location (pt. Location): Home    Distant Location (provider location):   [facility] - provider off site     Platform used for Video Visit: Eliot

## 2022-04-07 ENCOUNTER — TRANSCRIBE ORDERS (OUTPATIENT)
Dept: OTHER | Age: 52
End: 2022-04-07
Payer: COMMERCIAL

## 2022-04-07 DIAGNOSIS — Z12.11 COLON CANCER SCREENING: Primary | ICD-10-CM

## 2022-04-13 ENCOUNTER — TELEPHONE (OUTPATIENT)
Dept: GASTROENTEROLOGY | Facility: CLINIC | Age: 52
End: 2022-04-13
Payer: COMMERCIAL

## 2022-04-13 NOTE — TELEPHONE ENCOUNTER
Screening Questions  BlueKIND OF PREP RedLOCATION [review exclusion criteria] GreenSEDATION TYPE  1. Have you had a positive covid test in the last 90 days? N     2. Do you have a legal guardian or medical Power of ?  Are you able to give consent for your medical care?N (Sedation review/consideration needed)    3. Are you active on mychart? Y    4. What insurance is in the chart? BCBS     3.   Ordering/Referring Provider: Rosey Reece,    4. BMI 30.0   [BMI OVER 40-EXTENDED PREP]  If greater than 40 review exclusion criteria [PAC APPT IF @ [facility]]        5.  Respiratory Screening :  [If yes to any of the following HOSPITAL setting only]     Do you use daily home oxygen? N    Do you have mod to severe Obstructive Sleep Apnea? N  [OKAY @ [facility] SH PH RI]   Do you have Pulmonary Hypertension? N     Do you have UNCONTROLLED asthma? N        6.   Have you had a heart or lung transplant? N      7.   Are you currently on dialysis? N [ If yes, G-PREP & HOSPITAL setting only]     8.   Do you have chronic kidney disease? N [ If yes, G-PREP ]    9.   Have you had a stroke or Transient ischemic attack (TIA - aka  mini stroke ) within 6 months?  N (If yes, please review exclusion criteria)    10.   In the past 6 months, have you had any heart related issues including cardiomyopathy or heart attack? N           If yes, did it require cardiac stenting or other implantable device? N      11.   Do you have any implantable devices in your body (pacemaker, defib, LVAD)? N (If yes, please review exclusion criteria)    12.   Do you take nitroglycerin? N           If yes, how often? N  (if yes, HOSPITAL setting ONLY)    13.   Are you currently taking any blood thinners? N           [IF YES, INFORM PATIENT TO FOLLOW UP W/ ORDERING PROVIDER FOR BRIDGING INSTRUCTIONS]     14.   Do you have a diagnosis of diabetes? N   [ If yes, G-PREP ]    15.   [FEMALES] Are you currently pregnant? N    If yes, how many weeks?  N    16.   Are you taking any prescription pain medications on a routine schedule?  N  [ If yes, EXTENDED PREP.] [If yes, MAC]    17.   Do you have any chemical dependencies such as alcohol, street drugs, or methadone?  N [If yes, MAC]    18.   Do you have any history of post-traumatic stress syndrome, severe anxiety or history of psychosis?  N  [If yes, MAC]    19.   Do you transfer independently?  Y    20.  On a regular basis do you go 3-5 days between bowel movements? N   [ If yes, EXTENDED PREP.]    21.   Preferred LOCAL Pharmacy for Pre Prescription Y     [facility] [id number] [place] [facility] [place], [place] - [address]      Scheduling Details      Caller : Neelima  (Please ask for phone number if not scheduled by patient)    Type of Procedure Scheduled: Colon  Which Colonoscopy Prep was Sent?: M Prep  KHORUTS CF PATIENTS & GROEN'S PATIENTS NEEDS EXTENDED PREP  Surgeon: Max  Date of Procedure: 5-20  Location:       Sedation Type: CS  Conscious Sedation- Needs  for 6 hours after the procedure  MAC/General-Needs  for 24 hours after procedure    Pre-op Required at [facility] [place], [place] and OR for MAC sedation: Y  (advise patient they will need a pre-op prior to procedure -)      Informed patient they will need an adult  Y  Cannot take any type of public or medical transportation alone    Pre[facility]Pro[facility] or Externally: Y MG lab 5-16    Confirmed Nurse will call to complete assessment Y    Additional comments:

## 2022-04-16 DIAGNOSIS — Z11.59 ENCOUNTER FOR SCREENING FOR OTHER VIRAL DISEASES: Primary | ICD-10-CM

## 2022-04-28 DIAGNOSIS — J30.1 SEASONAL ALLERGIC RHINITIS DUE TO POLLEN: ICD-10-CM

## 2022-05-02 RX ORDER — AZELASTINE 1 MG/ML
1-2 SPRAY, METERED NASAL 2 TIMES DAILY
Refills: 3 | OUTPATIENT
Start: 2022-05-02

## 2022-05-08 ENCOUNTER — HEALTH MAINTENANCE LETTER (OUTPATIENT)
Age: 52
End: 2022-05-08

## 2022-05-16 ENCOUNTER — LAB (OUTPATIENT)
Dept: LAB | Facility: CLINIC | Age: 52
End: 2022-05-16
Attending: SURGERY
Payer: COMMERCIAL

## 2022-05-16 ENCOUNTER — LAB (OUTPATIENT)
Dept: LAB | Facility: CLINIC | Age: 52
End: 2022-05-16
Payer: COMMERCIAL

## 2022-05-16 DIAGNOSIS — L70.0 ACNE VULGARIS: Primary | ICD-10-CM

## 2022-05-16 DIAGNOSIS — Z11.59 ENCOUNTER FOR SCREENING FOR OTHER VIRAL DISEASES: ICD-10-CM

## 2022-05-16 DIAGNOSIS — L70.0 ACNE VULGARIS: ICD-10-CM

## 2022-05-16 LAB
BUN SERPL-MCNC: 13 MG/DL (ref 7–30)
CREAT SERPL-MCNC: 0.9 MG/DL (ref 0.52–1.04)
GFR SERPL CREATININE-BSD FRML MDRD: 77 ML/MIN/1.73M2
POTASSIUM BLD-SCNC: 3.9 MMOL/L (ref 3.4–5.3)

## 2022-05-16 PROCEDURE — 84520 ASSAY OF UREA NITROGEN: CPT

## 2022-05-16 PROCEDURE — U0003 INFECTIOUS AGENT DETECTION BY NUCLEIC ACID (DNA OR RNA); SEVERE ACUTE RESPIRATORY SYNDROME CORONAVIRUS 2 (SARS-COV-2) (CORONAVIRUS DISEASE [COVID-19]), AMPLIFIED PROBE TECHNIQUE, MAKING USE OF HIGH THROUGHPUT TECHNOLOGIES AS DESCRIBED BY CMS-2020-01-R: HCPCS

## 2022-05-16 PROCEDURE — 82565 ASSAY OF CREATININE: CPT

## 2022-05-16 PROCEDURE — U0005 INFEC AGEN DETEC AMPLI PROBE: HCPCS

## 2022-05-16 PROCEDURE — 84132 ASSAY OF SERUM POTASSIUM: CPT

## 2022-05-16 PROCEDURE — 36415 COLL VENOUS BLD VENIPUNCTURE: CPT

## 2022-05-17 LAB — SARS-COV-2 RNA RESP QL NAA+PROBE: NEGATIVE

## 2022-05-20 ENCOUNTER — HOSPITAL ENCOUNTER (OUTPATIENT)
Facility: AMBULATORY SURGERY CENTER | Age: 52
Discharge: HOME | End: 2022-05-20
Attending: SURGERY | Admitting: SURGERY
Payer: COMMERCIAL

## 2022-05-20 VITALS
TEMPERATURE: 97.8 F | DIASTOLIC BLOOD PRESSURE: 68 MMHG | RESPIRATION RATE: 16 BRPM | SYSTOLIC BLOOD PRESSURE: 98 MMHG | OXYGEN SATURATION: 100 %

## 2022-05-20 LAB — COLONOSCOPY: NORMAL

## 2022-05-20 PROCEDURE — G8918 PT W/O PREOP ORDER IV AB PRO: HCPCS

## 2022-05-20 PROCEDURE — 45385 COLONOSCOPY W/LESION REMOVAL: CPT | Mod: PT | Performed by: SURGERY

## 2022-05-20 PROCEDURE — 45385 COLONOSCOPY W/LESION REMOVAL: CPT

## 2022-05-20 PROCEDURE — 88305 TISSUE EXAM BY PATHOLOGIST: CPT | Performed by: PATHOLOGY

## 2022-05-20 PROCEDURE — 99152 MOD SED SAME PHYS/QHP 5/>YRS: CPT | Mod: 59 | Performed by: SURGERY

## 2022-05-20 PROCEDURE — G8907 PT DOC NO EVENTS ON DISCHARG: HCPCS

## 2022-05-20 RX ORDER — NALOXONE HYDROCHLORIDE 0.4 MG/ML
0.2 INJECTION, SOLUTION INTRAMUSCULAR; INTRAVENOUS; SUBCUTANEOUS
Status: CANCELLED | OUTPATIENT
Start: 2022-05-20

## 2022-05-20 RX ORDER — NALOXONE HYDROCHLORIDE 0.4 MG/ML
0.4 INJECTION, SOLUTION INTRAMUSCULAR; INTRAVENOUS; SUBCUTANEOUS
Status: CANCELLED | OUTPATIENT
Start: 2022-05-20

## 2022-05-20 RX ORDER — FENTANYL CITRATE 50 UG/ML
INJECTION, SOLUTION INTRAMUSCULAR; INTRAVENOUS PRN
Status: DISCONTINUED | OUTPATIENT
Start: 2022-05-20 | End: 2022-05-20 | Stop reason: HOSPADM

## 2022-05-20 RX ORDER — PROCHLORPERAZINE MALEATE 10 MG
10 TABLET ORAL EVERY 6 HOURS PRN
Status: CANCELLED | OUTPATIENT
Start: 2022-05-20

## 2022-05-20 RX ORDER — ONDANSETRON 2 MG/ML
4 INJECTION INTRAMUSCULAR; INTRAVENOUS EVERY 6 HOURS PRN
Status: CANCELLED | OUTPATIENT
Start: 2022-05-20

## 2022-05-20 RX ORDER — ONDANSETRON 2 MG/ML
4 INJECTION INTRAMUSCULAR; INTRAVENOUS
Status: DISCONTINUED | OUTPATIENT
Start: 2022-05-20 | End: 2022-05-21 | Stop reason: HOSPADM

## 2022-05-20 RX ORDER — ONDANSETRON 4 MG/1
4 TABLET, ORALLY DISINTEGRATING ORAL EVERY 6 HOURS PRN
Status: CANCELLED | OUTPATIENT
Start: 2022-05-20

## 2022-05-20 RX ORDER — LIDOCAINE 40 MG/G
CREAM TOPICAL
Status: DISCONTINUED | OUTPATIENT
Start: 2022-05-20 | End: 2022-05-21 | Stop reason: HOSPADM

## 2022-05-20 RX ORDER — FLUMAZENIL 0.1 MG/ML
0.2 INJECTION, SOLUTION INTRAVENOUS
Status: CANCELLED | OUTPATIENT
Start: 2022-05-20 | End: 2022-05-20

## 2022-05-20 NOTE — H&P
Pre-Endoscopy History and Physical     Neelima Buckley MRN# [id number]   YOB: 1970 Age: 51 year old     Date of Procedure: 2022  Primary care provider: Vani Yang  Type of Endoscopy: colonoscopy  Reason for Procedure: screening  Type of Anesthesia Anticipated: Moderate Sedation    HPI:    Neelima is a 51 year old female who will be undergoing the above procedure.    FH colon cancer in paternal aunt, no first degree relatives    A history and physical has been performed. The patient's medications and allergies have been reviewed. The risks and benefits of the procedure and the sedation options and risks were discussed with the patient.  All questions were answered and informed consent was obtained.      She denies a personal or family history of anesthesia complications or bleeding disorders.     Allergies   Allergen Reactions     Codeine      Sulfa Drugs         Cannot display prior to admission medications because the patient has not been admitted in this contact.     Current Outpatient Medications   Medication     azelastine (ASTELIN) 0.1 % nasal spray     cetirizine-pseudoePHEDrine ER (ZYRTEC-D) 5-120 MG 12 hr tablet     EXCEDRIN OR     hydrOXYzine (ATARAX) 50 MG tablet     spironolactone (ALDACTONE) 50 MG tablet     Current Facility-Administered Medications   Medication     lidocaine (LMX4) kit     lidocaine 1 % 0.1-1 mL     ondansetron (ZOFRAN) injection 4 mg     sodium chloride (PF) 0.9% PF flush 3 mL     sodium chloride (PF) 0.9% PF flush 3 mL         Patient Active Problem List   Diagnosis     Common wart     Seasonal allergic rhinitis due to pollen     Psychophysiological insomnia        Past Medical History:   Diagnosis Date     Pyelonephritis         Past Surgical History:   Procedure Laterality Date     C/SECTION, LOW TRANSVERSE      , Low Transverse       Social History     Tobacco Use     Smoking status: Former Smoker     Packs/day: 1.00     Types: Cigarettes     " Quit date: 10/10/2019     Years since quittin.6     Smokeless tobacco: Never Used   Substance Use Topics     Alcohol use: Yes     Comment: Occ       Family History   Problem Relation Age of Onset     Osteoporosis Mother      Diabetes Father      Colon Cancer Paternal Aunt        REVIEW OF SYSTEMS:     5 point ROS negative except as noted above in HPI, including Gen., Resp., CV, GI &  system review.      PHYSICAL EXAM:   /77   Temp 98  F (36.7  C) (Temporal)   Resp 16   SpO2 98%  Estimated body mass index is 31.76 kg/m  as calculated from the following:    Height as of 20: 1.632 m (5' 4.25\").    Weight as of 21: 84.6 kg (186 lb 8 oz).   GENERAL APPEARANCE: healthy, alert and no distress  MENTAL STATUS: alert  AIRWAY EXAM: Mask on  RESP: lungs clear to auscultation - no rales, rhonchi or wheezes  CV: regular rates and rhythm      DIAGNOSTICS:    Not indicated      IMPRESSION   ASA Class 2 - Mild systemic disease        PLAN:       Plan for colonoscopy. We discussed the risks, benefits and alternatives and the patient wished to proceed.    The above has been forwarded to the consulting provider.      Signed Electronically by: Bradley Santana MD  May 20, 2022    "

## 2022-05-25 DIAGNOSIS — J30.1 SEASONAL ALLERGIC RHINITIS DUE TO POLLEN: ICD-10-CM

## 2022-05-25 LAB
PATH REPORT.COMMENTS IMP SPEC: NORMAL
PATH REPORT.COMMENTS IMP SPEC: NORMAL
PATH REPORT.FINAL DX SPEC: NORMAL
PATH REPORT.GROSS SPEC: NORMAL
PATH REPORT.MICROSCOPIC SPEC OTHER STN: NORMAL
PATH REPORT.RELEVANT HX SPEC: NORMAL
PHOTO IMAGE: NORMAL

## 2022-05-25 RX ORDER — CETIRIZINE HYDROCHLORIDE, PSEUDOEPHEDRINE HYDROCHLORIDE 5; 120 MG/1; MG/1
TABLET, FILM COATED, EXTENDED RELEASE ORAL
Qty: 180 TABLET | Refills: 3 | OUTPATIENT
Start: 2022-05-25

## 2023-01-14 ENCOUNTER — HEALTH MAINTENANCE LETTER (OUTPATIENT)
Age: 53
End: 2023-01-14

## 2023-06-02 ENCOUNTER — HEALTH MAINTENANCE LETTER (OUTPATIENT)
Age: 53
End: 2023-06-02

## 2024-02-09 ENCOUNTER — TRANSFERRED RECORDS (OUTPATIENT)
Dept: FAMILY MEDICINE | Facility: CLINIC | Age: 54
End: 2024-02-09

## 2024-02-09 LAB
CHOLESTEROL (EXTERNAL): 278 MG/DL (ref 100–199)
CREATININE (EXTERNAL): 0.91 MG/DL (ref 0.52–1.04)
GFR ESTIMATED (EXTERNAL): 0 ML/MIN/1.73M2
GFR ESTIMATED (IF AFRICAN AMERICAN) (EXTERNAL): 0 ML/MIN/1.73M2
GLUCOSE (EXTERNAL): 98 MG/DL (ref 70–99)
HBA1C MFR BLD: 5.3 % (ref 0–5.7)
HDLC SERPL-MCNC: 67 MG/DL (ref 39–99)
LDL CHOLESTEROL CALCULATED (EXTERNAL): 183 MG/DL (ref 0–99)
NON HDL CHOLESTEROL (EXTERNAL): 0 MG/DL (ref 0–0)
PAP-ABSTRACT: NORMAL
TRIGLYCERIDES (EXTERNAL): 154 MG/DL (ref 0–149)
TSH SERPL-ACNC: 3.18 UIU/ML (ref 0.47–4.68)

## 2024-02-11 ENCOUNTER — HEALTH MAINTENANCE LETTER (OUTPATIENT)
Age: 54
End: 2024-02-11

## 2024-02-19 LAB — TSH SERPL-ACNC: 4.09 UIU/ML (ref 0.47–4.68)

## 2024-02-27 LAB — HPV ABSTRACT: NORMAL

## 2024-04-04 NOTE — PATIENT INSTRUCTIONS
Preventive Care Advice   This is general advice given by our system to help you stay healthy. However, your care team may have specific advice just for you. Please talk to your care team about your preventive care needs.  Nutrition  Eat 5 or more servings of fruits and vegetables each day.  Try wheat bread, brown rice and whole grain pasta (instead of white bread, rice, and pasta).  Get enough calcium and vitamin D. Check the label on foods and aim for 100% of the RDA (recommended daily allowance).  Lifestyle  Exercise at least 150 minutes each week   (30 minutes a day, 5 days a week).  Do muscle strengthening activities 2 days a week. These help control your weight and prevent disease.  No smoking.  Wear sunscreen to prevent skin cancer.  Have a dental exam and cleaning every 6 months.  Yearly exams  See your health care team every year to talk about:  Any changes in your health.  Any medicines your care team has prescribed.  Preventive care, family planning, and ways to prevent chronic diseases.  Shots (vaccines)   HPV shots (up to age 26), if you've never had them before.  Hepatitis B shots (up to age 59), if you've never had them before.  COVID-19 shot: Get this shot when it's due.  Flu shot: Get a flu shot every year.  Tetanus shot: Get a tetanus shot every 10 years.  Pneumococcal, hepatitis A, and RSV shots: Ask your care team if you need these based on your risk.  Shingles shot (for age 50 and up).  General health tests  Diabetes screening:  Starting at age 35, Get screened for diabetes at least every 3 years.  If you are younger than age 35, ask your care team if you should be screened for diabetes.  Cholesterol test: At age 39, start having a cholesterol test every 5 years, or more often if advised.  Bone density scan (DEXA): At age 50, ask your care team if you should have this scan for osteoporosis (brittle bones).  Hepatitis C: Get tested at least once in your life.  STIs (sexually transmitted  infections)  Before age 24: Ask your care team if you should be screened for STIs.  After age 24: Get screened for STIs if you're at risk. You are at risk for STIs (including HIV) if:  You are sexually active with more than one person.  You don't use condoms every time.  You or a partner was diagnosed with a sexually transmitted infection.  If you are at risk for HIV, ask about PrEP medicine to prevent HIV.  Get tested for HIV at least once in your life, whether you are at risk for HIV or not.  Cancer screening tests  Cervical cancer screening: If you have a cervix, begin getting regular cervical cancer screening tests at age 21. Most people who have regular screenings with normal results can stop after age 65. Talk about this with your provider.  Breast cancer scan (mammogram): If you've ever had breasts, begin having regular mammograms starting at age 40. This is a scan to check for breast cancer.  Colon cancer screening: It is important to start screening for colon cancer at age 45.  Have a colonoscopy test every 10 years (or more often if you're at risk) Or, ask your provider about stool tests like a FIT test every year or Cologuard test every 3 years.  To learn more about your testing options, visit: https://www.Fonality/280015.pdf.  For help making a decision, visit: https://bit.ly/ha27385.  Prostate cancer screening test: If you have a prostate and are age 55 to 69, ask your provider if you would benefit from a yearly prostate cancer screening test.  Lung cancer screening: If you are a current or former smoker age 50 to 80, ask your care team if ongoing lung cancer screenings are right for you.  For informational purposes only. Not to replace the advice of your health care provider. Copyright   2023 [employer]. All rights reserved. Clinically reviewed by the [facility] Transitions Program. [place] [id number] - REV 01/24.    The mediterranean way of eating is the most heart healthy and  can improve cholesterol levels. Try to incorporate elements of this into your daily nutrition.    Why choose the Mediterranean diet?  A Mediterranean-style diet may improve heart health. It contains more fat than other heart-healthy diets. But the fats are mainly from nuts, unsaturated oils (such as fish oils and olive oil), and certain nut or seed oils (such as canola, soybean, or flaxseed oil). These fats may help protect the heart and blood vessels.    How can you get started on the Mediterranean diet?  Here are some things you can do to switch to a more Mediterranean way of eating.    What to eat  Eat a variety of fruits and vegetables each day, such as grapes, blueberries, tomatoes, broccoli, peppers, figs, olives, spinach, eggplant, beans, lentils, and chickpeas.  Eat a variety of whole-grain foods each day, such as oats, brown rice, and whole wheat bread, pasta, and couscous.  Eat fish at least 2 times a week. Try tuna, salmon, mackerel, lake trout, herring, or sardines.  Eat moderate amounts of low-fat dairy products, such as milk, cheese, or yogurt.  Eat moderate amounts of poultry and eggs.  Choose healthy (unsaturated) fats, such as nuts, olive oil, and certain nut or seed oils like canola, soybean, and flaxseed.  Limit unhealthy (saturated) fats, such as butter, palm oil, and coconut oil. And limit fats found in animal products, such as meat and dairy products made with whole milk. Try to eat red meat only a few times a month in very small amounts.  Limit sweets and desserts to only a few times a week. This includes sugar-sweetened drinks like soda.    Tips for eating at home  Use herbs, spices, garlic, lemon zest, and citrus juice instead of salt to add flavor to foods.  Add avocado slices to your sandwich instead of iglesias.  Have fish for lunch or dinner instead of red meat. Brush the fish with olive oil, and broil or grill it.  Sprinkle your salad with seeds or nuts instead of cheese.  Cook with olive  or canola oil instead of butter or oils that are high in saturated fat.  Switch from 2% milk or whole milk to 1% or fat-free milk.  Dip raw vegetables in a vinaigrette dressing or hummus instead of dips made from mayonnaise or sour cream.  Have a piece of fruit for dessert instead of a piece of cake. Try baked apples, or have some dried fruit.    Tips for eating out  Try broiled, grilled, baked, or poached fish instead of having it fried or breaded.  Ask your  to have your meals prepared with olive oil instead of butter.  Order dishes made with marinara sauce or sauces made from olive oil. Avoid sauces made from cream or mayonnaise.  Choose whole-grain breads, whole wheat pasta and pizza crust, brown rice, beans, and lentils.  Cut back on butter or margarine on bread. Instead, you can dip your bread in a small amount of olive oil.  Ask for a side salad or grilled vegetables instead of french fries or chips.

## 2024-04-05 ENCOUNTER — OFFICE VISIT (OUTPATIENT)
Dept: FAMILY MEDICINE | Facility: CLINIC | Age: 54
End: 2024-04-05

## 2024-04-05 VITALS
DIASTOLIC BLOOD PRESSURE: 78 MMHG | WEIGHT: 181.6 LBS | BODY MASS INDEX: 31 KG/M2 | HEART RATE: 114 BPM | HEIGHT: 64 IN | SYSTOLIC BLOOD PRESSURE: 119 MMHG | OXYGEN SATURATION: 97 %

## 2024-04-05 DIAGNOSIS — J30.89 NON-SEASONAL ALLERGIC RHINITIS, UNSPECIFIED TRIGGER: ICD-10-CM

## 2024-04-05 DIAGNOSIS — F51.04 PSYCHOPHYSIOLOGICAL INSOMNIA: ICD-10-CM

## 2024-04-05 DIAGNOSIS — M54.12 CERVICAL RADICULOPATHY: ICD-10-CM

## 2024-04-05 DIAGNOSIS — L70.0 ACNE VULGARIS: ICD-10-CM

## 2024-04-05 DIAGNOSIS — Z00.00 ROUTINE GENERAL MEDICAL EXAMINATION AT A HEALTH CARE FACILITY: Primary | ICD-10-CM

## 2024-04-05 PROBLEM — J30.1 SEASONAL ALLERGIC RHINITIS DUE TO POLLEN: Status: RESOLVED | Noted: 2022-04-06 | Resolved: 2024-04-05

## 2024-04-05 PROCEDURE — 99204 OFFICE O/P NEW MOD 45 MIN: CPT | Mod: 25 | Performed by: FAMILY MEDICINE

## 2024-04-05 PROCEDURE — 99396 PREV VISIT EST AGE 40-64: CPT | Performed by: FAMILY MEDICINE

## 2024-04-05 RX ORDER — CLINDAMYCIN PHOSPHATE 10 UG/ML
LOTION TOPICAL
COMMUNITY
Start: 2023-08-15

## 2024-04-05 RX ORDER — AZELASTINE 1 MG/ML
1-2 SPRAY, METERED NASAL 2 TIMES DAILY
Qty: 30 ML | Refills: 3 | Status: SHIPPED | OUTPATIENT
Start: 2024-04-05 | End: 2024-04-29

## 2024-04-05 RX ORDER — CHOLECALCIFEROL (VITAMIN D3) 50 MCG
1 TABLET ORAL DAILY
COMMUNITY

## 2024-04-05 RX ORDER — HYDROXYZINE HYDROCHLORIDE 50 MG/1
50 TABLET, FILM COATED ORAL
Qty: 30 TABLET | Refills: 3 | Status: SHIPPED | OUTPATIENT
Start: 2024-04-05

## 2024-04-05 RX ORDER — MONTELUKAST SODIUM 10 MG/1
10 TABLET ORAL AT BEDTIME
Qty: 90 TABLET | Refills: 3 | Status: SHIPPED | OUTPATIENT
Start: 2024-04-05

## 2024-04-05 SDOH — HEALTH STABILITY: PHYSICAL HEALTH: ON AVERAGE, HOW MANY DAYS PER WEEK DO YOU ENGAGE IN MODERATE TO STRENUOUS EXERCISE (LIKE A BRISK WALK)?: 4 DAYS

## 2024-04-05 SDOH — HEALTH STABILITY: PHYSICAL HEALTH: ON AVERAGE, HOW MANY MINUTES DO YOU ENGAGE IN EXERCISE AT THIS LEVEL?: 30 MIN

## 2024-04-05 ASSESSMENT — SOCIAL DETERMINANTS OF HEALTH (SDOH): HOW OFTEN DO YOU GET TOGETHER WITH FRIENDS OR RELATIVES?: ONCE A WEEK

## 2024-04-29 DIAGNOSIS — J30.89 NON-SEASONAL ALLERGIC RHINITIS, UNSPECIFIED TRIGGER: ICD-10-CM

## 2024-04-29 RX ORDER — AZELASTINE 1 MG/ML
1-2 SPRAY, METERED NASAL 2 TIMES DAILY
Qty: 90 ML | Refills: 3 | Status: SHIPPED | OUTPATIENT
Start: 2024-04-29

## 2024-05-02 ENCOUNTER — ANCILLARY PROCEDURE (OUTPATIENT)
Dept: MRI IMAGING | Facility: CLINIC | Age: 54
End: 2024-05-02
Attending: FAMILY MEDICINE
Payer: COMMERCIAL

## 2024-05-02 DIAGNOSIS — M54.12 CERVICAL RADICULOPATHY: ICD-10-CM

## 2024-05-02 PROCEDURE — 72141 MRI NECK SPINE W/O DYE: CPT | Performed by: RADIOLOGY

## 2024-05-03 ENCOUNTER — TELEPHONE (OUTPATIENT)
Dept: FAMILY MEDICINE | Facility: CLINIC | Age: 54
End: 2024-05-03

## 2024-05-03 DIAGNOSIS — M48.02 NEURAL FORAMINAL STENOSIS OF CERVICAL SPINE: Primary | ICD-10-CM

## 2024-05-03 NOTE — TELEPHONE ENCOUNTER
Called patient with results of MRI C spine. Progression from mild to moderate C spine foraminal stenosis (with radicular symptoms affecting ADLs). In agreement with referral for specialist management (conservative vs surgical discussed). Referral to I spine.    Neural foraminal stenosis of cervical spine  -     Spine  Referral; Future

## 2024-05-08 ENCOUNTER — TRANSFERRED RECORDS (OUTPATIENT)
Dept: FAMILY MEDICINE | Facility: CLINIC | Age: 54
End: 2024-05-08

## 2024-05-16 ENCOUNTER — TRANSFERRED RECORDS (OUTPATIENT)
Dept: FAMILY MEDICINE | Facility: CLINIC | Age: 54
End: 2024-05-16

## 2024-05-30 ENCOUNTER — TRANSFERRED RECORDS (OUTPATIENT)
Dept: FAMILY MEDICINE | Facility: CLINIC | Age: 54
End: 2024-05-30

## 2025-01-23 ENCOUNTER — TELEPHONE (OUTPATIENT)
Dept: FAMILY MEDICINE | Facility: CLINIC | Age: 55
End: 2025-01-23

## 2025-01-23 DIAGNOSIS — M54.12 CERVICAL RADICULOPATHY: Primary | ICD-10-CM

## 2025-01-23 DIAGNOSIS — M48.02 NEURAL FORAMINAL STENOSIS OF CERVICAL SPINE: ICD-10-CM

## 2025-01-23 RX ORDER — GABAPENTIN 300 MG/1
300-600 CAPSULE ORAL AT BEDTIME
Qty: 180 CAPSULE | Refills: 0 | Status: SHIPPED | OUTPATIENT
Start: 2025-01-23

## 2025-01-23 NOTE — TELEPHONE ENCOUNTER
Plan: per Dr. Antonio, sent rx to pharmacy for gabapentin 300mg #180 si-2 caps at bedtime. Patient advised this was done and to call to schedule annual CPE/med check to be done in April before needs next fill. Patient agreed.   Carmelina Perez RN    
"Patient left message on nurse line requesting Dr. Antonio take over prescribing her gabapentin 300mg #60 caps si-2 po at bedtime.   [facility] has been prescribing but patient prefers to not go back if Dr. Antonio will take over.  States the gabapentin is working well to help her sleep and \"keep my hands from falling asleep/buzzing\".     Requesting 90 day prescription (#180). States leaving town next week for 6 weeks.     Last related visit: New Patient 24 CPE with Dr. Antonio   Note from visit:   10 h/o right arm numbness and tingling. Extensive work up with neurologist 6 years ago: pt reports unrevealing. Right hand dominant. Now worsening with loss of  strength. Constructs cabinets, demos bathrooms etc : affecting ADLs.   On review of MRI C spine 2015 : mild right foraminal stenosis C5-6 which correlated to patients symptoms.   Repeat MRI c spine to monitor for progression / worsening.   Consider referral to IR / neurosurgery based on results.   -     MR Cervical Spine w/o Contrast; Future    5/3/24 referred to [facility] due to worsening symptoms and MRI showed progression from mild to moderate cervical spine foraminal stenosis.     24 seen by [facility] - see consult note in EPIC. HIREN, PT and gabapentin ordered.   24 follow up visit with [facility] - HIREN provided 90% improvement; gabapentin helping with sleep        Fill history per [place] :             Plan: routed request to Dr. Antonio   "
(4) Less than 3 years old

## (undated) DEVICE — SOL WATER IRRIG 1000ML BOTTLE 07139-09

## (undated) DEVICE — PREP CHLORAPREP 26ML TINTED ORANGE  260815

## (undated) RX ORDER — FENTANYL CITRATE 50 UG/ML
INJECTION, SOLUTION INTRAMUSCULAR; INTRAVENOUS
Status: DISPENSED
Start: 2022-05-20